# Patient Record
Sex: FEMALE | Race: WHITE | NOT HISPANIC OR LATINO | Employment: OTHER | URBAN - METROPOLITAN AREA
[De-identification: names, ages, dates, MRNs, and addresses within clinical notes are randomized per-mention and may not be internally consistent; named-entity substitution may affect disease eponyms.]

---

## 2017-10-19 ENCOUNTER — HOSPITAL ENCOUNTER (OUTPATIENT)
Dept: MAMMOGRAPHY | Facility: HOSPITAL | Age: 82
Discharge: HOME | End: 2017-10-19

## 2018-10-23 ENCOUNTER — HOSPITAL ENCOUNTER (OUTPATIENT)
Dept: MAMMOGRAPHY | Facility: HOSPITAL | Age: 83
Discharge: HOME | End: 2018-10-23

## 2021-01-18 ENCOUNTER — OFFICE VISIT (OUTPATIENT)
Dept: SURGERY | Facility: CLINIC | Age: 86
End: 2021-01-18

## 2021-01-18 VITALS
HEIGHT: 65 IN | OXYGEN SATURATION: 100 % | RESPIRATION RATE: 18 BRPM | TEMPERATURE: 97.8 F | WEIGHT: 126.2 LBS | BODY MASS INDEX: 21.02 KG/M2 | SYSTOLIC BLOOD PRESSURE: 158 MMHG | DIASTOLIC BLOOD PRESSURE: 79 MMHG | HEART RATE: 63 BPM

## 2021-01-18 DIAGNOSIS — F32.0 MAJOR DEPRESSIVE DISORDER, SINGLE EPISODE, MILD (HCC): ICD-10-CM

## 2021-01-18 DIAGNOSIS — R92.8 ABNORMAL MAMMOGRAM OF LEFT BREAST: Primary | ICD-10-CM

## 2021-01-18 PROCEDURE — 99204 OFFICE O/P NEW MOD 45 MIN: CPT | Performed by: SURGERY

## 2021-01-18 RX ORDER — MIRTAZAPINE 15 MG/1
15 TABLET, FILM COATED ORAL
COMMUNITY

## 2021-01-18 RX ORDER — PENTOXIFYLLINE 400 MG/1
400 TABLET, EXTENDED RELEASE ORAL 2 TIMES DAILY
COMMUNITY
Start: 2017-07-18

## 2021-01-18 RX ORDER — ASPIRIN 81 MG/1
81 TABLET ORAL DAILY
COMMUNITY

## 2021-01-18 RX ORDER — NIACIN 500 MG/1
500 TABLET, EXTENDED RELEASE ORAL
COMMUNITY

## 2021-01-18 RX ORDER — TRAMADOL HYDROCHLORIDE 50 MG/1
100 TABLET ORAL EVERY 6 HOURS PRN
COMMUNITY

## 2021-01-18 NOTE — PROGRESS NOTES
GENERAL SURGERY CONSULTATION NOTE    Consult requested by: Dr. Saavedra    Patient Care Team:  Julissa Saavedra MD as PCP - General    Reason for consult: left breast calcifications    Subjective     Patient is a 86 y.o. female presents with a new area of suspicious calcifications in the upper outer aspect of her left breast.  The patient regularly gets her screening mammograms, and had a screening mammogram which was performed on December 4, 2020.  Her last mammogram on November 12, 2019 was reportedly normal.  She then subsequently had a diagnostic mammogram on the left breast performed on 1/5/2021.  This demonstrated a biopsy marker clip near the 12 o'clock position of the left breast, and a new grouping of calcifications in the lateral left breast located near the 1 to 2 o'clock position.  These calcifications were noted to be heterogenous in appearance, coarse, with some irregular jagged borders.  Stereotactic biopsy was recommended.  Prior to undergoing her mammogram, the patient denied noting any changes of her breast including skin changes, lumps, masses, irregularities of the nipple, or discharge coming from the nipple.  The patient had a lumpectomy performed on her left breast which was benign in 1976.  She reports that she started having her period at the age of 15, had 2 live births the oldest of which at age 21, never breast-fed either child.  She did not take birth control pills.  She went through menopause at the age of 50, and took estrogen supplementation for several years.  Family history is significant for a maternal aunt with breast cancer diagnosed in her 60s.  Maternal uncle with esophageal cancer.  Her mother had none melanotic skin cancers, and her maternal grandmother had a history of anorectal cancer.    Review of Systems   Constitutional: Negative for appetite change, chills and fever.   HENT: Positive for hearing loss. Negative for congestion and sore throat.    Respiratory: Negative for  cough and shortness of breath.    Cardiovascular: Negative for chest pain and palpitations.   Gastrointestinal: Negative for abdominal pain, constipation, diarrhea, nausea, vomiting and GERD.   Genitourinary: Negative for breast discharge, breast lump, breast pain, difficulty urinating, dysuria and frequency.   Musculoskeletal: Negative for arthralgias and back pain.   Skin: Negative for rash and skin lesions.   Neurological: Negative for dizziness, seizures and memory problem.   Hematological: Negative for adenopathy. Does not bruise/bleed easily.   Psychiatric/Behavioral: Negative for sleep disturbance and depressed mood.        History  Past Medical History:   Diagnosis Date   • Breast lump    • Osteoporosis      Past Surgical History:   Procedure Laterality Date   • APPENDECTOMY     • BREAST LUMPECTOMY      Left   • REPLACEMENT TOTAL KNEE      Right   • TONSILLECTOMY       No family history on file.  Social History     Tobacco Use   • Smoking status: Never Smoker   • Smokeless tobacco: Never Used   Substance Use Topics   • Alcohol use: Not on file   • Drug use: Not on file     (Not in a hospital admission)    Allergies:  Morphine and Ampicillin    Objective     Vital Signs  Temp:  [97.8 °F (36.6 °C)] 97.8 °F (36.6 °C)  Heart Rate:  [63] 63  Resp:  [18] 18  BP: (158)/(79) 158/79    Physical Exam  Exam conducted with a chaperone present.   Constitutional:       Appearance: She is well-developed.   HENT:      Head: Normocephalic and atraumatic.   Eyes:      Pupils: Pupils are equal, round, and reactive to light.   Neck:      Musculoskeletal: Normal range of motion.   Cardiovascular:      Rate and Rhythm: Normal rate and regular rhythm.   Pulmonary:      Effort: Pulmonary effort is normal.      Breath sounds: Normal breath sounds.   Chest:      Comments: Both breasts were examined in the upright and supine position.  Both breasts exhibit normal shape and contour.  There are no dominant masses in either breast.   About a centimeter above the nipple areolar complex on the left there is a transverse oriented incision which is well-healed consistent with the patient's prior lumpectomy incision.  There is no nipple retraction, or discharge from the nipple bilaterally.  Abdominal:      General: There is no distension.      Palpations: Abdomen is soft.      Tenderness: There is no abdominal tenderness.      Hernia: No hernia is present.   Musculoskeletal: Normal range of motion.   Lymphadenopathy:      Cervical: No cervical adenopathy.      Upper Body:      Right upper body: No supraclavicular or axillary adenopathy.      Left upper body: No supraclavicular or axillary adenopathy.   Skin:     General: Skin is warm and dry.      Findings: No rash.   Neurological:      Mental Status: She is alert and oriented to person, place, and time.         Results Review:   Lab Results (last 24 hours)     ** No results found for the last 24 hours. **        No radiology results for the last day      I reviewed the patient's new imaging results and agree with the interpretation.  I reviewed the patient's other test results and agree with the interpretation    Assessment/Plan     Active Problems:  Abnormal mammogram on the left    Discussed with patient that I recommend obtaining a core needle biopsy of the area of concern in the left nipple.  Once we have pathologic confirmation as to the etiology of these calcifications, I can better  her regarding whether or not surgical intervention will be needed, and which surgical intervention will be recommended.  She will need to stop her aspirin prior to undergoing corneal biopsy.  I have ordered a stereotactic biopsy of the left breast.  I will see her back in my office about 1 week following her biopsy to review her pathology and reexamine her.    I discussed the patients findings and my recommendations with the patient and her daughter.     Bryan Gallegos MD  01/18/21  12:27  EST

## 2021-01-27 ENCOUNTER — HOSPITAL ENCOUNTER (OUTPATIENT)
Dept: MAMMOGRAPHY | Facility: HOSPITAL | Age: 86
Discharge: HOME | End: 2021-01-27

## 2021-01-27 DIAGNOSIS — R92.8 ABNORMAL MAMMOGRAM OF LEFT BREAST: ICD-10-CM

## 2021-01-27 PROCEDURE — 25010000003 LIDOCAINE 1 % SOLUTION: Performed by: SURGERY

## 2021-01-27 PROCEDURE — 88305 TISSUE EXAM BY PATHOLOGIST: CPT | Performed by: SURGERY

## 2021-01-27 PROCEDURE — 76098 X-RAY EXAM SURGICAL SPECIMEN: CPT

## 2021-01-27 RX ORDER — LIDOCAINE HYDROCHLORIDE AND EPINEPHRINE 10; 10 MG/ML; UG/ML
10 INJECTION, SOLUTION INFILTRATION; PERINEURAL ONCE
Status: COMPLETED | OUTPATIENT
Start: 2021-01-27 | End: 2021-01-27

## 2021-01-27 RX ORDER — LIDOCAINE HYDROCHLORIDE 10 MG/ML
2.5 INJECTION, SOLUTION INFILTRATION; PERINEURAL ONCE
Status: COMPLETED | OUTPATIENT
Start: 2021-01-27 | End: 2021-01-27

## 2021-01-27 RX ADMIN — LIDOCAINE HYDROCHLORIDE 3 ML: 10 INJECTION, SOLUTION INFILTRATION; PERINEURAL at 10:48

## 2021-01-27 RX ADMIN — LIDOCAINE HYDROCHLORIDE,EPINEPHRINE BITARTRATE 9 ML: 10; .01 INJECTION, SOLUTION INFILTRATION; PERINEURAL at 10:48

## 2021-01-29 LAB
LAB AP CASE REPORT: NORMAL
PATH REPORT.FINAL DX SPEC: NORMAL
PATH REPORT.GROSS SPEC: NORMAL

## 2021-02-03 ENCOUNTER — OFFICE VISIT (OUTPATIENT)
Dept: SURGERY | Facility: CLINIC | Age: 86
End: 2021-02-03

## 2021-02-03 VITALS
TEMPERATURE: 97.3 F | HEIGHT: 65 IN | WEIGHT: 125 LBS | BODY MASS INDEX: 20.83 KG/M2 | SYSTOLIC BLOOD PRESSURE: 156 MMHG | DIASTOLIC BLOOD PRESSURE: 68 MMHG | HEART RATE: 73 BPM | OXYGEN SATURATION: 99 %

## 2021-02-03 DIAGNOSIS — R92.8 ABNORMAL MAMMOGRAM OF LEFT BREAST: Primary | ICD-10-CM

## 2021-02-03 PROCEDURE — 99212 OFFICE O/P EST SF 10 MIN: CPT | Performed by: SURGERY

## 2021-02-03 RX ORDER — PRAMIPEXOLE DIHYDROCHLORIDE 0.5 MG/1
0.5 TABLET ORAL 2 TIMES DAILY
COMMUNITY
Start: 2021-01-28

## 2021-02-03 NOTE — PROGRESS NOTES
GENERAL SURGERY CONSULTATION NOTE    Consult requested by: Dr. Saavedra    Patient Care Team:  Julissa Saavedra MD as PCP - General    Reason for consult: left breast calcifications    Subjective   From 1/18/2021:  Patient is a 86 y.o. female presents with a new area of suspicious calcifications in the upper outer aspect of her left breast.  The patient regularly gets her screening mammograms, and had a screening mammogram which was performed on December 4, 2020.  Her last mammogram on November 12, 2019 was reportedly normal.  She then subsequently had a diagnostic mammogram on the left breast performed on 1/5/2021.  This demonstrated a biopsy marker clip near the 12 o'clock position of the left breast, and a new grouping of calcifications in the lateral left breast located near the 1 to 2 o'clock position.  These calcifications were noted to be heterogenous in appearance, coarse, with some irregular jagged borders.  Stereotactic biopsy was recommended.  Prior to undergoing her mammogram, the patient denied noting any changes of her breast including skin changes, lumps, masses, irregularities of the nipple, or discharge coming from the nipple.  The patient had a lumpectomy performed on her left breast which was benign in 1976.  She reports that she started having her period at the age of 15, had 2 live births the oldest of which at age 21, never breast-fed either child.  She did not take birth control pills.  She went through menopause at the age of 50, and took estrogen supplementation for several years.  Family history is significant for a maternal aunt with breast cancer diagnosed in her 60s.  Maternal uncle with esophageal cancer.  Her mother had none melanotic skin cancers, and her maternal grandmother had a history of anorectal cancer.    From today:  Patient had stereotactic left breast biopsy performed on 1/27/2021.  Tolerated well without any difficulty.  No bleeding postoperatively.  No complaints of  pain.    Review of Systems   Constitutional: Negative for appetite change, chills and fever.   HENT: Positive for hearing loss. Negative for congestion and sore throat.    Respiratory: Negative for cough and shortness of breath.    Cardiovascular: Negative for chest pain and palpitations.   Gastrointestinal: Negative for abdominal pain, constipation, diarrhea, nausea, vomiting and GERD.   Genitourinary: Negative for breast discharge, breast lump, breast pain, difficulty urinating, dysuria and frequency.   Musculoskeletal: Negative for arthralgias and back pain.   Skin: Negative for rash and skin lesions.   Neurological: Negative for dizziness, seizures and memory problem.   Hematological: Negative for adenopathy. Does not bruise/bleed easily.   Psychiatric/Behavioral: Negative for sleep disturbance and depressed mood.        History  Past Medical History:   Diagnosis Date   • Breast lump    • Osteoporosis      Past Surgical History:   Procedure Laterality Date   • APPENDECTOMY     • BREAST LUMPECTOMY      Left   • REPLACEMENT TOTAL KNEE      Right   • TONSILLECTOMY       No family history on file.  Social History     Tobacco Use   • Smoking status: Never Smoker   • Smokeless tobacco: Never Used   Substance Use Topics   • Alcohol use: Not on file   • Drug use: Not on file     (Not in a hospital admission)    Allergies:  Morphine and Ampicillin    Objective     Vital Signs  Temp:  [97.3 °F (36.3 °C)] 97.3 °F (36.3 °C)  Heart Rate:  [73] 73  BP: (156)/(68) 156/68    Physical Exam  Exam conducted with a chaperone present.   Constitutional:       Appearance: She is well-developed.   HENT:      Head: Normocephalic and atraumatic.   Eyes:      Pupils: Pupils are equal, round, and reactive to light.   Neck:      Musculoskeletal: Normal range of motion.   Cardiovascular:      Rate and Rhythm: Normal rate and regular rhythm.   Pulmonary:      Effort: Pulmonary effort is normal.      Breath sounds: Normal breath sounds.    Chest:      Comments: Both breasts were examined in the upright and supine position.  Both breasts exhibit normal shape and contour.  There are no dominant masses in either breast.  About a centimeter above the nipple areolar complex on the left there is a transverse oriented incision which is well-healed consistent with the patient's prior lumpectomy incision.  On the superior lateral aspect of the left breast there is a pinpoint area consistent with her biopsy site.  There is no hematoma.  There is no nipple retraction, or discharge from the nipple bilaterally.  Abdominal:      General: There is no distension.      Palpations: Abdomen is soft.      Tenderness: There is no abdominal tenderness.      Hernia: No hernia is present.   Musculoskeletal: Normal range of motion.   Lymphadenopathy:      Cervical: No cervical adenopathy.      Upper Body:      Right upper body: No supraclavicular or axillary adenopathy.      Left upper body: No supraclavicular or axillary adenopathy.   Skin:     General: Skin is warm and dry.      Findings: No rash.   Neurological:      Mental Status: She is alert and oriented to person, place, and time.         Results Review:   Lab Results (last 24 hours)     ** No results found for the last 24 hours. **        No radiology results for the last day      I reviewed the patient's new imaging results and agree with the interpretation.  I reviewed the patient's other test results and agree with the interpretation    Assessment/Plan     Active Problems:  Abnormal mammogram on the left    No immediate complications from stereotactic biopsy  Pathology reviewed with the patient and daughters:  Calcifications, left breast, biopsies:    Benign breast tissue with stromal fibrosis    Microcalcifications identified within benign stroma    No malignancy identified   This is considered to be a concordant finding.  However upon review of the report from the biopsy, it was technically challenging, and the  radiologist would prefer to repeat the imaging in 6 months to ensure that this is in fact a benign process.  Discussed this with the patient, who understands.  We will obtain a repeat diagnostic mammogram and ultrasound in 6 months and I will see her back in my office after that.    I discussed the patients findings and my recommendations with the patient and her daughter.     Bryan Gallegos MD  02/03/21  10:49 EST

## 2022-06-30 ENCOUNTER — OFFICE (OUTPATIENT)
Dept: URBAN - METROPOLITAN AREA CLINIC 64 | Facility: CLINIC | Age: 87
End: 2022-06-30

## 2022-06-30 VITALS
HEIGHT: 60 IN | HEART RATE: 68 BPM | WEIGHT: 121 LBS | SYSTOLIC BLOOD PRESSURE: 164 MMHG | DIASTOLIC BLOOD PRESSURE: 85 MMHG

## 2022-06-30 DIAGNOSIS — D50.9 IRON DEFICIENCY ANEMIA, UNSPECIFIED: ICD-10-CM

## 2022-06-30 PROCEDURE — 99204 OFFICE O/P NEW MOD 45 MIN: CPT | Performed by: INTERNAL MEDICINE

## 2022-06-30 NOTE — SERVICEHPINOTES
Restart all medications  Buy right handed carpal tunnel splint and try wearing it to see if notice any difference   
DOMENIC OSULLIVAN  is a    87   year old  female   c hx of OA and back surgery who is being seen in the office today for   anemia. She was having fatigue and had labs showing hgb 8.5. She was given IV iron and hgb improved. She notes dark stool on oral MVI c iron. No abd pain, brbpr, n/v, dysphagia or bowel issues. Her wt has been stable. No nsaids. 
br
br br
About 10 years ago reportedly normal colonoscopybr
br  March 2022 hgb 8.5 mcv 76

## 2022-08-22 RX ORDER — DIPHENOXYLATE HYDROCHLORIDE AND ATROPINE SULFATE 2.5; .025 MG/1; MG/1
1 TABLET ORAL DAILY
COMMUNITY

## 2022-08-27 ENCOUNTER — LAB (OUTPATIENT)
Dept: LAB | Facility: HOSPITAL | Age: 87
End: 2022-08-27

## 2022-08-27 PROCEDURE — C9803 HOPD COVID-19 SPEC COLLECT: HCPCS

## 2022-08-27 PROCEDURE — U0005 INFEC AGEN DETEC AMPLI PROBE: HCPCS

## 2022-08-27 PROCEDURE — U0004 COV-19 TEST NON-CDC HGH THRU: HCPCS

## 2022-08-28 LAB — SARS-COV-2 ORF1AB RESP QL NAA+PROBE: NOT DETECTED

## 2022-08-29 ENCOUNTER — ANESTHESIA EVENT (OUTPATIENT)
Dept: GASTROENTEROLOGY | Facility: HOSPITAL | Age: 87
End: 2022-08-29

## 2022-08-29 RX ORDER — SODIUM CHLORIDE 9 MG/ML
9 INJECTION, SOLUTION INTRAVENOUS CONTINUOUS PRN
Status: CANCELLED | OUTPATIENT
Start: 2022-08-29

## 2022-08-29 RX ORDER — SODIUM CHLORIDE 0.9 % (FLUSH) 0.9 %
10 SYRINGE (ML) INJECTION EVERY 12 HOURS SCHEDULED
Status: CANCELLED | OUTPATIENT
Start: 2022-08-29

## 2022-08-29 RX ORDER — MIDAZOLAM HYDROCHLORIDE 1 MG/ML
0.5 INJECTION INTRAMUSCULAR; INTRAVENOUS
Status: CANCELLED | OUTPATIENT
Start: 2022-08-29

## 2022-08-29 RX ORDER — SODIUM CHLORIDE 0.9 % (FLUSH) 0.9 %
10 SYRINGE (ML) INJECTION AS NEEDED
Status: CANCELLED | OUTPATIENT
Start: 2022-08-29

## 2022-08-30 ENCOUNTER — ON CAMPUS - OUTPATIENT (OUTPATIENT)
Dept: URBAN - METROPOLITAN AREA HOSPITAL 85 | Facility: HOSPITAL | Age: 87
End: 2022-08-30

## 2022-08-30 ENCOUNTER — HOSPITAL ENCOUNTER (OUTPATIENT)
Facility: HOSPITAL | Age: 87
Setting detail: HOSPITAL OUTPATIENT SURGERY
Discharge: HOME | End: 2022-08-30
Attending: INTERNAL MEDICINE | Admitting: INTERNAL MEDICINE

## 2022-08-30 ENCOUNTER — ANESTHESIA (OUTPATIENT)
Dept: GASTROENTEROLOGY | Facility: HOSPITAL | Age: 87
End: 2022-08-30

## 2022-08-30 VITALS
TEMPERATURE: 98.2 F | DIASTOLIC BLOOD PRESSURE: 91 MMHG | RESPIRATION RATE: 26 BRPM | HEART RATE: 79 BPM | SYSTOLIC BLOOD PRESSURE: 147 MMHG | WEIGHT: 120 LBS | OXYGEN SATURATION: 99 % | BODY MASS INDEX: 23.56 KG/M2 | HEIGHT: 60 IN

## 2022-08-30 DIAGNOSIS — K31.89 OTHER DISEASES OF STOMACH AND DUODENUM: ICD-10-CM

## 2022-08-30 DIAGNOSIS — K25.7 CHRONIC GASTRIC ULCER WITHOUT HEMORRHAGE OR PERFORATION: ICD-10-CM

## 2022-08-30 DIAGNOSIS — D50.0 IRON DEFICIENCY ANEMIA SECONDARY TO BLOOD LOSS (CHRONIC): ICD-10-CM

## 2022-08-30 PROCEDURE — 43239 EGD BIOPSY SINGLE/MULTIPLE: CPT | Performed by: INTERNAL MEDICINE

## 2022-08-30 PROCEDURE — 25010000002 PROPOFOL 200 MG/20ML EMULSION: Performed by: ANESTHESIOLOGY

## 2022-08-30 PROCEDURE — 88305 TISSUE EXAM BY PATHOLOGIST: CPT | Performed by: INTERNAL MEDICINE

## 2022-08-30 RX ORDER — SODIUM CHLORIDE 0.9 % (FLUSH) 0.9 %
3 SYRINGE (ML) INJECTION EVERY 12 HOURS SCHEDULED
Status: CANCELLED | OUTPATIENT
Start: 2022-08-30

## 2022-08-30 RX ORDER — SODIUM CHLORIDE 0.9 % (FLUSH) 0.9 %
3-10 SYRINGE (ML) INJECTION AS NEEDED
Status: CANCELLED | OUTPATIENT
Start: 2022-08-30

## 2022-08-30 RX ORDER — PANTOPRAZOLE SODIUM 40 MG/1
40 TABLET, DELAYED RELEASE ORAL DAILY
Qty: 90 TABLET | Refills: 3 | Status: SHIPPED | OUTPATIENT
Start: 2022-08-30

## 2022-08-30 RX ORDER — ONDANSETRON 2 MG/ML
4 INJECTION INTRAMUSCULAR; INTRAVENOUS ONCE AS NEEDED
Status: DISCONTINUED | OUTPATIENT
Start: 2022-08-30 | End: 2022-08-30 | Stop reason: HOSPADM

## 2022-08-30 RX ORDER — PROPOFOL 10 MG/ML
INJECTION, EMULSION INTRAVENOUS AS NEEDED
Status: DISCONTINUED | OUTPATIENT
Start: 2022-08-30 | End: 2022-08-30 | Stop reason: SURG

## 2022-08-30 RX ORDER — SODIUM CHLORIDE 9 MG/ML
INJECTION, SOLUTION INTRAVENOUS CONTINUOUS PRN
Status: DISCONTINUED | OUTPATIENT
Start: 2022-08-30 | End: 2022-08-30 | Stop reason: SURG

## 2022-08-30 RX ADMIN — SODIUM CHLORIDE: 0.9 INJECTION, SOLUTION INTRAVENOUS at 11:31

## 2022-08-30 RX ADMIN — PROPOFOL 80 MG: 10 INJECTION, EMULSION INTRAVENOUS at 11:34

## 2022-08-30 RX ADMIN — PROPOFOL 30 MG: 10 INJECTION, EMULSION INTRAVENOUS at 11:36

## 2022-08-30 NOTE — ANESTHESIA PREPROCEDURE EVALUATION
Anesthesia Evaluation     Patient summary reviewed and Nursing notes reviewed   NPO Solid Status: > 8 hours  NPO Liquid Status: > 8 hours           Airway   Mallampati: II  TM distance: >3 FB  Neck ROM: full  No difficulty expected  Dental - normal exam     Pulmonary - negative pulmonary ROS and normal exam   Cardiovascular - normal exam    (+) hypertension,       Neuro/Psych  (+) tremors, psychiatric history Depression,    GI/Hepatic/Renal/Endo - negative ROS     Musculoskeletal (-) negative ROS    Abdominal  - normal exam    Bowel sounds: normal.   Substance History - negative use     OB/GYN negative ob/gyn ROS         Other                        Anesthesia Plan    ASA 3     MAC   total IV anesthesia  intravenous induction     Anesthetic plan, risks, benefits, and alternatives have been provided, discussed and informed consent has been obtained with: patient.  Pre-procedure education provided      CODE STATUS:

## 2022-08-30 NOTE — ANESTHESIA POSTPROCEDURE EVALUATION
Patient: Juany Dalton    Procedure Summary     Date: 08/30/22 Room / Location: [facility] ENDOSCOPY 1 / [facility] ENDOSCOPY    Anesthesia Start: 1131 Anesthesia Stop: 1139    Procedure: ESOPHAGOGASTRODUODENOSCOPY (N/A ) Diagnosis:       Iron deficiency anemia secondary to blood loss (chronic)      (Iron deficiency anemia secondary to blood loss (chronic) [D50.0])    Surgeons: Jan Ortega MD Provider: Aureliano Ingram MD    Anesthesia Type: MAC ASA Status: 3          Anesthesia Type: MAC    Vitals  No vitals data found for the desired time range.          Post Anesthesia Care and Evaluation    Patient location during evaluation: PACU  Patient participation: complete - patient participated  Level of consciousness: awake  Pain scale: See nurse's notes for pain score.  Pain management: adequate    Airway patency: patent  Anesthetic complications: No anesthetic complications  PONV Status: none  Cardiovascular status: acceptable  Respiratory status: acceptable  Hydration status: acceptable    Comments: Patient seen and examined postoperatively; vital signs stable; SpO2 greater than or equal to 90%; cardiopulmonary status stable; nausea/vomiting adequately controlled; pain adequately controlled; no apparent anesthesia complications; patient discharged from anesthesia care when discharge criteria were met

## 2022-08-31 LAB
LAB AP CASE REPORT: NORMAL
LAB AP CLINICAL INFORMATION: NORMAL
PATH REPORT.FINAL DX SPEC: NORMAL
PATH REPORT.GROSS SPEC: NORMAL

## 2024-01-05 ENCOUNTER — APPOINTMENT (OUTPATIENT)
Dept: GENERAL RADIOLOGY | Facility: HOSPITAL | Age: 89
End: 2024-01-05
Payer: MEDICARE

## 2024-01-05 ENCOUNTER — APPOINTMENT (OUTPATIENT)
Dept: CT IMAGING | Facility: HOSPITAL | Age: 89
End: 2024-01-05
Payer: MEDICARE

## 2024-01-05 ENCOUNTER — HOSPITAL ENCOUNTER (EMERGENCY)
Facility: HOSPITAL | Age: 89
Discharge: HOME | End: 2024-01-05
Payer: MEDICARE

## 2024-01-05 VITALS
SYSTOLIC BLOOD PRESSURE: 177 MMHG | WEIGHT: 120 LBS | RESPIRATION RATE: 16 BRPM | OXYGEN SATURATION: 96 % | TEMPERATURE: 98 F | HEIGHT: 60 IN | DIASTOLIC BLOOD PRESSURE: 79 MMHG | BODY MASS INDEX: 23.56 KG/M2 | HEART RATE: 82 BPM

## 2024-01-05 DIAGNOSIS — R29.898 LEG WEAKNESS, BILATERAL: Primary | ICD-10-CM

## 2024-01-05 LAB
ANION GAP SERPL CALCULATED.3IONS-SCNC: 11 MMOL/L (ref 5–15)
BACTERIA UR QL AUTO: ABNORMAL /HPF
BASOPHILS # BLD AUTO: 0.1 10*3/MM3 (ref 0–0.2)
BASOPHILS NFR BLD AUTO: 1.1 % (ref 0–1.5)
BILIRUB UR QL STRIP: NEGATIVE
BUN SERPL-MCNC: 24 MG/DL (ref 8–23)
BUN/CREAT SERPL: 22.9 (ref 7–25)
CALCIUM SPEC-SCNC: 9.3 MG/DL (ref 8.6–10.5)
CHLORIDE SERPL-SCNC: 104 MMOL/L (ref 98–107)
CLARITY UR: CLEAR
CO2 SERPL-SCNC: 24 MMOL/L (ref 22–29)
COLOR UR: YELLOW
CREAT SERPL-MCNC: 1.05 MG/DL (ref 0.57–1)
DEPRECATED RDW RBC AUTO: 45.1 FL (ref 37–54)
EGFRCR SERPLBLD CKD-EPI 2021: 50.9 ML/MIN/1.73
EOSINOPHIL # BLD AUTO: 0.1 10*3/MM3 (ref 0–0.4)
EOSINOPHIL NFR BLD AUTO: 0.9 % (ref 0.3–6.2)
ERYTHROCYTE [DISTWIDTH] IN BLOOD BY AUTOMATED COUNT: 14.3 % (ref 12.3–15.4)
FLUAV SUBTYP SPEC NAA+PROBE: NOT DETECTED
FLUBV RNA ISLT QL NAA+PROBE: NOT DETECTED
GEN 5 2HR TROPONIN T REFLEX: 33 NG/L
GLUCOSE SERPL-MCNC: 81 MG/DL (ref 65–99)
GLUCOSE UR STRIP-MCNC: NEGATIVE MG/DL
HCT VFR BLD AUTO: 35.9 % (ref 34–46.6)
HGB BLD-MCNC: 11.6 G/DL (ref 12–15.9)
HGB UR QL STRIP.AUTO: ABNORMAL
HOLD SPECIMEN: NORMAL
HOLD SPECIMEN: NORMAL
HYALINE CASTS UR QL AUTO: ABNORMAL /LPF
KETONES UR QL STRIP: NEGATIVE
LEUKOCYTE ESTERASE UR QL STRIP.AUTO: NEGATIVE
LYMPHOCYTES # BLD AUTO: 1.5 10*3/MM3 (ref 0.7–3.1)
LYMPHOCYTES NFR BLD AUTO: 27.1 % (ref 19.6–45.3)
MCH RBC QN AUTO: 27.5 PG (ref 26.6–33)
MCHC RBC AUTO-ENTMCNC: 32.4 G/DL (ref 31.5–35.7)
MCV RBC AUTO: 85 FL (ref 79–97)
MONOCYTES # BLD AUTO: 0.4 10*3/MM3 (ref 0.1–0.9)
MONOCYTES NFR BLD AUTO: 7.5 % (ref 5–12)
NEUTROPHILS NFR BLD AUTO: 3.4 10*3/MM3 (ref 1.7–7)
NEUTROPHILS NFR BLD AUTO: 63.4 % (ref 42.7–76)
NITRITE UR QL STRIP: NEGATIVE
NRBC BLD AUTO-RTO: 0 /100 WBC (ref 0–0.2)
NT-PROBNP SERPL-MCNC: 555.7 PG/ML (ref 0–1800)
PH UR STRIP.AUTO: 6.5 [PH] (ref 5–8)
PLATELET # BLD AUTO: 201 10*3/MM3 (ref 140–450)
PMV BLD AUTO: 7.1 FL (ref 6–12)
POTASSIUM SERPL-SCNC: 4.4 MMOL/L (ref 3.5–5.2)
PROT UR QL STRIP: NEGATIVE
RBC # BLD AUTO: 4.22 10*6/MM3 (ref 3.77–5.28)
RBC # UR STRIP: ABNORMAL /HPF
REF LAB TEST METHOD: ABNORMAL
SARS-COV-2 RNA RESP QL NAA+PROBE: NOT DETECTED
SODIUM SERPL-SCNC: 139 MMOL/L (ref 136–145)
SP GR UR STRIP: 1.01 (ref 1–1.03)
SQUAMOUS #/AREA URNS HPF: ABNORMAL /HPF
TROPONIN T DELTA: 1 NG/L
TROPONIN T SERPL HS-MCNC: 32 NG/L
UROBILINOGEN UR QL STRIP: ABNORMAL
WBC # UR STRIP: ABNORMAL /HPF
WBC NRBC COR # BLD AUTO: 5.4 10*3/MM3 (ref 3.4–10.8)
WHOLE BLOOD HOLD COAG: NORMAL
WHOLE BLOOD HOLD SPECIMEN: NORMAL

## 2024-01-05 PROCEDURE — 36415 COLL VENOUS BLD VENIPUNCTURE: CPT

## 2024-01-05 PROCEDURE — 99284 EMERGENCY DEPT VISIT MOD MDM: CPT

## 2024-01-05 PROCEDURE — 84484 ASSAY OF TROPONIN QUANT: CPT | Performed by: PHYSICIAN ASSISTANT

## 2024-01-05 PROCEDURE — P9612 CATHETERIZE FOR URINE SPEC: HCPCS

## 2024-01-05 PROCEDURE — 85025 COMPLETE CBC W/AUTO DIFF WBC: CPT | Performed by: PHYSICIAN ASSISTANT

## 2024-01-05 PROCEDURE — 80048 BASIC METABOLIC PNL TOTAL CA: CPT | Performed by: PHYSICIAN ASSISTANT

## 2024-01-05 PROCEDURE — 87636 SARSCOV2 & INF A&B AMP PRB: CPT | Performed by: PHYSICIAN ASSISTANT

## 2024-01-05 PROCEDURE — 71045 X-RAY EXAM CHEST 1 VIEW: CPT

## 2024-01-05 PROCEDURE — 70450 CT HEAD/BRAIN W/O DYE: CPT

## 2024-01-05 PROCEDURE — 93005 ELECTROCARDIOGRAM TRACING: CPT | Performed by: PHYSICIAN ASSISTANT

## 2024-01-05 PROCEDURE — 81001 URINALYSIS AUTO W/SCOPE: CPT | Performed by: PHYSICIAN ASSISTANT

## 2024-01-05 PROCEDURE — 83880 ASSAY OF NATRIURETIC PEPTIDE: CPT | Performed by: PHYSICIAN ASSISTANT

## 2024-01-05 RX ORDER — SODIUM CHLORIDE 0.9 % (FLUSH) 0.9 %
10 SYRINGE (ML) INJECTION AS NEEDED
Status: DISCONTINUED | OUTPATIENT
Start: 2024-01-05 | End: 2024-01-05 | Stop reason: HOSPADM

## 2024-01-05 NOTE — ED NOTES
Pt reports difficulty walking this am. Felt like she was shuffling. Has had this for 2-3 years but it is worse today.

## 2024-01-06 LAB
QT INTERVAL: 384 MS
QTC INTERVAL: 439 MS

## 2024-01-06 NOTE — ED PROVIDER NOTES
Subjective   History of Present Illness  Patient is an 89-year-old female who is brought in by her daughter for feeling like she is having leg weakness today.  She reports that it feels like there is a disconnect between her brain and her legs working.  She has been resting all day.  Her daughter does report that maybe she did not eat and drink as much is normal.  Denies any chest pain abdominal pain cough congestion fevers chills.        Review of Systems   Constitutional:  Negative for chills, diaphoresis, fatigue and fever.   HENT:  Negative for congestion, ear pain, sinus pressure, sore throat, trouble swallowing and voice change.    Respiratory:  Negative for cough.    Cardiovascular:  Negative for chest pain and palpitations.   Gastrointestinal:  Negative for abdominal distention, nausea and vomiting.   Genitourinary: Negative.    Musculoskeletal: Negative.    Skin: Negative.    Neurological: Negative.  Positive for weakness.   Psychiatric/Behavioral: Negative.         Past Medical History:   Diagnosis Date    Arthritis     Back pain     Breast lump     Leg cramps     Osteoporosis     Tremor        Allergies   Allergen Reactions    Morphine Hives and Rash    Ampicillin Hives       Past Surgical History:   Procedure Laterality Date    APPENDECTOMY      BREAST LUMPECTOMY      Left    ENDOSCOPY N/A 8/30/2022    Procedure: ESOPHAGOGASTRODUODENOSCOPY;  Surgeon: Jan Ortega MD;  Location: [facility] ENDOSCOPY;  Service: Gastroenterology;  Laterality: N/A;  normal    REPLACEMENT TOTAL KNEE      Right    TONSILLECTOMY         No family history on file.    Social History     Socioeconomic History    Marital status:    Tobacco Use    Smoking status: Never    Smokeless tobacco: Never   Substance and Sexual Activity    Alcohol use: Not Currently    Drug use: Not Currently    Sexual activity: Defer           Objective   Physical Exam  Vitals and nursing note reviewed.   Constitutional:       General: She is not in  "acute distress.     Appearance: Normal appearance. She is well-developed. She is not ill-appearing, toxic-appearing or diaphoretic.   HENT:      Head: Normocephalic and atraumatic.      Nose: Nose normal.   Eyes:      Conjunctiva/sclera: Conjunctivae normal.   Cardiovascular:      Rate and Rhythm: Normal rate and regular rhythm.   Pulmonary:      Effort: Pulmonary effort is normal. No respiratory distress.      Breath sounds: Normal breath sounds. No stridor. No wheezing, rhonchi or rales.   Musculoskeletal:         General: Normal range of motion.      Cervical back: Normal range of motion and neck supple.   Skin:     General: Skin is warm and dry.   Neurological:      General: No focal deficit present.      Mental Status: She is alert and oriented to person, place, and time. Mental status is at baseline.      Cranial Nerves: No cranial nerve deficit.      Sensory: No sensory deficit.      Motor: No weakness.      Coordination: Coordination normal.      Comments: Negative pronator drift  Strength intact in upper and lower extremity against resistance.  Sensation intact in upper and lower extremities.     Psychiatric:         Mood and Affect: Mood normal.         Behavior: Behavior normal.         Thought Content: Thought content normal.         Judgment: Judgment normal.         Procedures           ED Course  ED Course as of 01/05/24 2042 Fri Jan 05, 2024 2008 EKG interpreted by ER physician reviewed by myself.  Sinus rhythm rate of 79 prolonged UT interval [MG]      ED Course User Index  [MG] Elise Rhodes PA-C                                             Medical Decision Making  Appropriate PPE worn during exam.    /72   Pulse 74   Temp 98.3 °F (36.8 °C) (Oral)   Resp 18   Ht 152.4 cm (60\")   Wt 54.4 kg (120 lb)   SpO2 97%   BMI 23.44 kg/m²      Co-morbidities --  has a past medical history of Arthritis, Back pain, Breast lump, Leg cramps, Osteoporosis, and Tremor.  Radiology interpretation --  " X-rays reviewed by me and interpreted by radiologist:  CT Head Without Contrast    Result Date: 1/5/2024  Impression: No acute intracranial abnormality. If there is persistent clinical concern for acute stroke, please consider follow-up with dedicated MRI of the brain Electronically Signed: Raheem Ramirez DO  1/5/2024 7:31 PM EST  Workstation ID: [id number]    XR Chest 1 View    Result Date: 1/5/2024  Impression: 1. No acute cardiopulmonary disease. Electronically Signed: Jm Streeter MD  1/5/2024 6:15 PM EST  Workstation ID: [id number]        Differentials -- Includes, but not limited to the following:     Patient is an 89-year-old female presents with generalized leg weakness.  No objective weakness on exam.  I offered to keep the patient for evaluation for physical therapy for weakness which she declined her imaging and lab work was fairly unremarkable although she did have some elevation in her troponin repeat troponin had no change.  Stable at time of discharge return precautions were provided she was in agreement with plan.  Daughter is at bedside.  I discussed the findings and recommendations with the patient who voices understanding. Stable while in the ER.     Note Disclaimer: At [facility], we believe that sharing information builds trust and better relationships. You are receiving this note because you are receiving care at [facility] or recently visited. It is possible you will see health information before a provider has talked with you about it. This kind of information can be easy to misunderstand. To help you fully understand what it means for your health, we urge you to discuss this note with your provider.        Problems Addressed:  Leg weakness, bilateral: complicated acute illness or injury    Amount and/or Complexity of Data Reviewed  Labs: ordered.  Radiology: ordered.  ECG/medicine tests: ordered.    Risk  Prescription drug management.        Final diagnoses:   Leg weakness,  bilateral       ED Disposition  ED Disposition       ED Disposition   Discharge    Condition   Stable    Comment   --               Dao Ortega MD  [address] [facility] [address] [facility] [address]  [place] [id number]  [phone]    Schedule an appointment as soon as possible for a visit       Natan Kearney MD  [address] [facility] [address]  [place] [id number] [phone]               Medication List      No changes were made to your prescriptions during this visit.            Elise Rhodes PA-C  01/05/24 2041

## 2024-11-07 ENCOUNTER — APPOINTMENT (OUTPATIENT)
Dept: GENERAL RADIOLOGY | Facility: HOSPITAL | Age: 89
DRG: 607 | End: 2024-11-07
Payer: MEDICARE

## 2024-11-07 ENCOUNTER — HOSPITAL ENCOUNTER (INPATIENT)
Facility: HOSPITAL | Age: 89
LOS: 4 days | Discharge: HOME | DRG: 607 | End: 2024-11-12
Attending: EMERGENCY MEDICINE
Payer: MEDICARE

## 2024-11-07 ENCOUNTER — APPOINTMENT (OUTPATIENT)
Dept: CT IMAGING | Facility: HOSPITAL | Age: 89
DRG: 607 | End: 2024-11-07
Payer: MEDICARE

## 2024-11-07 DIAGNOSIS — R06.02 SHORTNESS OF BREATH: ICD-10-CM

## 2024-11-07 DIAGNOSIS — G89.29 CHRONIC MIDLINE LOW BACK PAIN WITHOUT SCIATICA: ICD-10-CM

## 2024-11-07 DIAGNOSIS — M54.50 CHRONIC MIDLINE LOW BACK PAIN WITHOUT SCIATICA: ICD-10-CM

## 2024-11-07 DIAGNOSIS — R79.89 ELEVATED TROPONIN: Primary | ICD-10-CM

## 2024-11-07 DIAGNOSIS — R22.2 CHEST WALL MASS: ICD-10-CM

## 2024-11-07 DIAGNOSIS — R60.0 BILATERAL LEG EDEMA: ICD-10-CM

## 2024-11-07 PROBLEM — C79.51 LUNG CANCER METASTATIC TO BONE: Status: ACTIVE | Noted: 2024-11-07

## 2024-11-07 PROBLEM — C79.51 LUNG CANCER METASTATIC TO BONE: Status: RESOLVED | Noted: 2024-11-07 | Resolved: 2024-11-07

## 2024-11-07 PROBLEM — M79.89 LEG SWELLING: Status: ACTIVE | Noted: 2024-11-07

## 2024-11-07 PROBLEM — C34.90 LUNG CANCER METASTATIC TO BONE: Status: RESOLVED | Noted: 2024-11-07 | Resolved: 2024-11-07

## 2024-11-07 PROBLEM — C34.90 LUNG CANCER METASTATIC TO BONE: Status: ACTIVE | Noted: 2024-11-07

## 2024-11-07 LAB
ALBUMIN SERPL-MCNC: 4.5 G/DL (ref 3.5–5.2)
ALBUMIN/GLOB SERPL: 1.9 G/DL
ALP SERPL-CCNC: 50 U/L (ref 39–117)
ALT SERPL W P-5'-P-CCNC: 18 U/L (ref 1–33)
ANION GAP SERPL CALCULATED.3IONS-SCNC: 14.2 MMOL/L (ref 5–15)
AST SERPL-CCNC: 28 U/L (ref 1–32)
BACTERIA UR QL AUTO: NORMAL /HPF
BASOPHILS # BLD AUTO: 0.02 10*3/MM3 (ref 0–0.2)
BASOPHILS NFR BLD AUTO: 0.3 % (ref 0–1.5)
BILIRUB SERPL-MCNC: 0.3 MG/DL (ref 0–1.2)
BILIRUB UR QL STRIP: NEGATIVE
BUN SERPL-MCNC: 19 MG/DL (ref 8–23)
BUN/CREAT SERPL: 14.8 (ref 7–25)
CALCIUM SPEC-SCNC: 9.7 MG/DL (ref 8.6–10.5)
CHLORIDE SERPL-SCNC: 97 MMOL/L (ref 98–107)
CLARITY UR: CLEAR
CO2 SERPL-SCNC: 21.8 MMOL/L (ref 22–29)
COLOR UR: YELLOW
CREAT SERPL-MCNC: 1.28 MG/DL (ref 0.57–1)
DEPRECATED RDW RBC AUTO: 42.9 FL (ref 37–54)
EGFRCR SERPLBLD CKD-EPI 2021: 40.1 ML/MIN/1.73
EOSINOPHIL # BLD AUTO: 0.11 10*3/MM3 (ref 0–0.4)
EOSINOPHIL NFR BLD AUTO: 1.9 % (ref 0.3–6.2)
ERYTHROCYTE [DISTWIDTH] IN BLOOD BY AUTOMATED COUNT: 13.5 % (ref 12.3–15.4)
GEN 5 2HR TROPONIN T REFLEX: 54 NG/L
GLOBULIN UR ELPH-MCNC: 2.4 GM/DL
GLUCOSE SERPL-MCNC: 96 MG/DL (ref 65–99)
GLUCOSE UR STRIP-MCNC: NEGATIVE MG/DL
HCT VFR BLD AUTO: 34.5 % (ref 34–46.6)
HGB BLD-MCNC: 11 G/DL (ref 12–15.9)
HGB UR QL STRIP.AUTO: ABNORMAL
HOLD SPECIMEN: NORMAL
HOLD SPECIMEN: NORMAL
HYALINE CASTS UR QL AUTO: NORMAL /LPF
IMM GRANULOCYTES # BLD AUTO: 0.02 10*3/MM3 (ref 0–0.05)
IMM GRANULOCYTES NFR BLD AUTO: 0.3 % (ref 0–0.5)
KETONES UR QL STRIP: NEGATIVE
LEUKOCYTE ESTERASE UR QL STRIP.AUTO: ABNORMAL
LYMPHOCYTES # BLD AUTO: 0.74 10*3/MM3 (ref 0.7–3.1)
LYMPHOCYTES NFR BLD AUTO: 12.6 % (ref 19.6–45.3)
MAGNESIUM SERPL-MCNC: 1.9 MG/DL (ref 1.6–2.4)
MCH RBC QN AUTO: 27.7 PG (ref 26.6–33)
MCHC RBC AUTO-ENTMCNC: 31.9 G/DL (ref 31.5–35.7)
MCV RBC AUTO: 86.9 FL (ref 79–97)
MONOCYTES # BLD AUTO: 0.41 10*3/MM3 (ref 0.1–0.9)
MONOCYTES NFR BLD AUTO: 7 % (ref 5–12)
NEUTROPHILS NFR BLD AUTO: 4.58 10*3/MM3 (ref 1.7–7)
NEUTROPHILS NFR BLD AUTO: 77.9 % (ref 42.7–76)
NITRITE UR QL STRIP: NEGATIVE
NRBC BLD AUTO-RTO: 0 /100 WBC (ref 0–0.2)
NT-PROBNP SERPL-MCNC: 946 PG/ML (ref 0–1800)
PH UR STRIP.AUTO: 6 [PH] (ref 5–8)
PLATELET # BLD AUTO: 229 10*3/MM3 (ref 140–450)
PMV BLD AUTO: 9.1 FL (ref 6–12)
POTASSIUM SERPL-SCNC: 4.7 MMOL/L (ref 3.5–5.2)
PROT SERPL-MCNC: 6.9 G/DL (ref 6–8.5)
PROT UR QL STRIP: NEGATIVE
RBC # BLD AUTO: 3.97 10*6/MM3 (ref 3.77–5.28)
RBC # UR STRIP: NORMAL /HPF
REF LAB TEST METHOD: NORMAL
SODIUM SERPL-SCNC: 133 MMOL/L (ref 136–145)
SP GR UR STRIP: 1.01 (ref 1–1.03)
SQUAMOUS #/AREA URNS HPF: NORMAL /HPF
TROPONIN T DELTA: 0 NG/L
TROPONIN T SERPL HS-MCNC: 54 NG/L
UROBILINOGEN UR QL STRIP: ABNORMAL
WBC # UR STRIP: NORMAL /HPF
WBC NRBC COR # BLD AUTO: 5.88 10*3/MM3 (ref 3.4–10.8)
WHOLE BLOOD HOLD COAG: NORMAL
WHOLE BLOOD HOLD SPECIMEN: NORMAL

## 2024-11-07 PROCEDURE — 36415 COLL VENOUS BLD VENIPUNCTURE: CPT

## 2024-11-07 PROCEDURE — 25010000002 FUROSEMIDE PER 20 MG: Performed by: EMERGENCY MEDICINE

## 2024-11-07 PROCEDURE — 71260 CT THORAX DX C+: CPT

## 2024-11-07 PROCEDURE — 25510000001 IOPAMIDOL PER 1 ML: Performed by: EMERGENCY MEDICINE

## 2024-11-07 PROCEDURE — 84484 ASSAY OF TROPONIN QUANT: CPT | Performed by: EMERGENCY MEDICINE

## 2024-11-07 PROCEDURE — G0378 HOSPITAL OBSERVATION PER HR: HCPCS

## 2024-11-07 PROCEDURE — 85025 COMPLETE CBC W/AUTO DIFF WBC: CPT | Performed by: EMERGENCY MEDICINE

## 2024-11-07 PROCEDURE — 93005 ELECTROCARDIOGRAM TRACING: CPT | Performed by: EMERGENCY MEDICINE

## 2024-11-07 PROCEDURE — 83735 ASSAY OF MAGNESIUM: CPT

## 2024-11-07 PROCEDURE — P9612 CATHETERIZE FOR URINE SPEC: HCPCS

## 2024-11-07 PROCEDURE — 99285 EMERGENCY DEPT VISIT HI MDM: CPT

## 2024-11-07 PROCEDURE — 25010000002 HYDROMORPHONE 1 MG/ML SOLUTION: Performed by: EMERGENCY MEDICINE

## 2024-11-07 PROCEDURE — 71045 X-RAY EXAM CHEST 1 VIEW: CPT

## 2024-11-07 PROCEDURE — 80053 COMPREHEN METABOLIC PANEL: CPT | Performed by: EMERGENCY MEDICINE

## 2024-11-07 PROCEDURE — 83880 ASSAY OF NATRIURETIC PEPTIDE: CPT | Performed by: EMERGENCY MEDICINE

## 2024-11-07 PROCEDURE — 81001 URINALYSIS AUTO W/SCOPE: CPT | Performed by: EMERGENCY MEDICINE

## 2024-11-07 RX ORDER — LIDOCAINE 4 G/G
1 PATCH TOPICAL NIGHTLY
Status: DISCONTINUED | OUTPATIENT
Start: 2024-11-07 | End: 2024-11-12 | Stop reason: HOSPADM

## 2024-11-07 RX ORDER — NITROGLYCERIN 0.4 MG/1
0.4 TABLET SUBLINGUAL
Status: DISCONTINUED | OUTPATIENT
Start: 2024-11-07 | End: 2024-11-12 | Stop reason: HOSPADM

## 2024-11-07 RX ORDER — METOPROLOL TARTRATE 25 MG/1
25 TABLET, FILM COATED ORAL 2 TIMES DAILY WITH MEALS
Status: DISCONTINUED | OUTPATIENT
Start: 2024-11-08 | End: 2024-11-09

## 2024-11-07 RX ORDER — SODIUM CHLORIDE 0.9 % (FLUSH) 0.9 %
10 SYRINGE (ML) INJECTION AS NEEDED
Status: DISCONTINUED | OUTPATIENT
Start: 2024-11-07 | End: 2024-11-12 | Stop reason: HOSPADM

## 2024-11-07 RX ORDER — IOPAMIDOL 755 MG/ML
100 INJECTION, SOLUTION INTRAVASCULAR
Status: COMPLETED | OUTPATIENT
Start: 2024-11-07 | End: 2024-11-07

## 2024-11-07 RX ORDER — ENOXAPARIN SODIUM 100 MG/ML
30 INJECTION SUBCUTANEOUS DAILY
Status: DISCONTINUED | OUTPATIENT
Start: 2024-11-07 | End: 2024-11-07

## 2024-11-07 RX ORDER — NALOXONE HCL 0.4 MG/ML
0.4 VIAL (ML) INJECTION
Status: DISCONTINUED | OUTPATIENT
Start: 2024-11-07 | End: 2024-11-12 | Stop reason: HOSPADM

## 2024-11-07 RX ORDER — BISACODYL 10 MG
10 SUPPOSITORY, RECTAL RECTAL DAILY PRN
Status: DISCONTINUED | OUTPATIENT
Start: 2024-11-07 | End: 2024-11-07

## 2024-11-07 RX ORDER — PENTOXIFYLLINE 400 MG/1
400 TABLET, EXTENDED RELEASE ORAL 2 TIMES DAILY
Status: DISCONTINUED | OUTPATIENT
Start: 2024-11-08 | End: 2024-11-12 | Stop reason: HOSPADM

## 2024-11-07 RX ORDER — POLYETHYLENE GLYCOL 3350 17 G/17G
17 POWDER, FOR SOLUTION ORAL DAILY PRN
Status: DISCONTINUED | OUTPATIENT
Start: 2024-11-07 | End: 2024-11-12 | Stop reason: HOSPADM

## 2024-11-07 RX ORDER — ENOXAPARIN SODIUM 100 MG/ML
30 INJECTION SUBCUTANEOUS EVERY 24 HOURS
Status: DISCONTINUED | OUTPATIENT
Start: 2024-11-08 | End: 2024-11-12 | Stop reason: HOSPADM

## 2024-11-07 RX ORDER — AMOXICILLIN 250 MG
2 CAPSULE ORAL 2 TIMES DAILY
Status: DISCONTINUED | OUTPATIENT
Start: 2024-11-07 | End: 2024-11-12 | Stop reason: HOSPADM

## 2024-11-07 RX ORDER — PRAMIPEXOLE DIHYDROCHLORIDE 0.5 MG/1
0.5 TABLET ORAL NIGHTLY
Status: DISCONTINUED | OUTPATIENT
Start: 2024-11-08 | End: 2024-11-09

## 2024-11-07 RX ORDER — BISACODYL 5 MG/1
5 TABLET, DELAYED RELEASE ORAL DAILY PRN
Status: DISCONTINUED | OUTPATIENT
Start: 2024-11-07 | End: 2024-11-12 | Stop reason: HOSPADM

## 2024-11-07 RX ORDER — AMOXICILLIN 250 MG
2 CAPSULE ORAL 2 TIMES DAILY
Status: DISCONTINUED | OUTPATIENT
Start: 2024-11-07 | End: 2024-11-07

## 2024-11-07 RX ORDER — SULFAMETHOXAZOLE AND TRIMETHOPRIM 800; 160 MG/1; MG/1
1 TABLET ORAL 2 TIMES DAILY
COMMUNITY
End: 2024-11-12 | Stop reason: HOSPADM

## 2024-11-07 RX ORDER — POLYETHYLENE GLYCOL 3350 17 G/17G
17 POWDER, FOR SOLUTION ORAL DAILY PRN
Status: DISCONTINUED | OUTPATIENT
Start: 2024-11-07 | End: 2024-11-07

## 2024-11-07 RX ORDER — SODIUM CHLORIDE 0.9 % (FLUSH) 0.9 %
10 SYRINGE (ML) INJECTION EVERY 12 HOURS SCHEDULED
Status: DISCONTINUED | OUTPATIENT
Start: 2024-11-07 | End: 2024-11-07

## 2024-11-07 RX ORDER — LATANOPROST 50 UG/ML
1 SOLUTION/ DROPS OPHTHALMIC NIGHTLY
Status: DISCONTINUED | OUTPATIENT
Start: 2024-11-08 | End: 2024-11-12 | Stop reason: HOSPADM

## 2024-11-07 RX ORDER — ASPIRIN 81 MG/1
324 TABLET, CHEWABLE ORAL ONCE
Status: COMPLETED | OUTPATIENT
Start: 2024-11-07 | End: 2024-11-07

## 2024-11-07 RX ORDER — MIRTAZAPINE 15 MG/1
15 TABLET, FILM COATED ORAL NIGHTLY
Status: DISCONTINUED | OUTPATIENT
Start: 2024-11-08 | End: 2024-11-12 | Stop reason: HOSPADM

## 2024-11-07 RX ORDER — BISACODYL 5 MG/1
5 TABLET, DELAYED RELEASE ORAL DAILY PRN
Status: DISCONTINUED | OUTPATIENT
Start: 2024-11-07 | End: 2024-11-07

## 2024-11-07 RX ORDER — HYDROCODONE BITARTRATE AND ACETAMINOPHEN 5; 325 MG/1; MG/1
1 TABLET ORAL ONCE AS NEEDED
Status: COMPLETED | OUTPATIENT
Start: 2024-11-07 | End: 2024-11-07

## 2024-11-07 RX ORDER — METHOCARBAMOL 750 MG/1
750 TABLET, FILM COATED ORAL 4 TIMES DAILY
Status: DISCONTINUED | OUTPATIENT
Start: 2024-11-07 | End: 2024-11-07

## 2024-11-07 RX ORDER — BISACODYL 10 MG
10 SUPPOSITORY, RECTAL RECTAL DAILY PRN
Status: DISCONTINUED | OUTPATIENT
Start: 2024-11-07 | End: 2024-11-12 | Stop reason: HOSPADM

## 2024-11-07 RX ORDER — SODIUM CHLORIDE 9 MG/ML
40 INJECTION, SOLUTION INTRAVENOUS AS NEEDED
Status: DISCONTINUED | OUTPATIENT
Start: 2024-11-07 | End: 2024-11-07 | Stop reason: SDUPTHER

## 2024-11-07 RX ORDER — DICYCLOMINE HYDROCHLORIDE 10 MG/1
20 CAPSULE ORAL DAILY PRN
Status: DISCONTINUED | OUTPATIENT
Start: 2024-11-07 | End: 2024-11-12 | Stop reason: HOSPADM

## 2024-11-07 RX ORDER — LATANOPROST 50 UG/ML
1 SOLUTION/ DROPS OPHTHALMIC NIGHTLY
COMMUNITY

## 2024-11-07 RX ORDER — TRAMADOL HYDROCHLORIDE 50 MG/1
50 TABLET ORAL EVERY 12 HOURS PRN
Status: DISCONTINUED | OUTPATIENT
Start: 2024-11-07 | End: 2024-11-08

## 2024-11-07 RX ORDER — SODIUM CHLORIDE 9 MG/ML
40 INJECTION, SOLUTION INTRAVENOUS AS NEEDED
Status: DISCONTINUED | OUTPATIENT
Start: 2024-11-07 | End: 2024-11-12 | Stop reason: HOSPADM

## 2024-11-07 RX ORDER — NITROGLYCERIN 0.4 MG/1
0.4 TABLET SUBLINGUAL
Status: DISCONTINUED | OUTPATIENT
Start: 2024-11-07 | End: 2024-11-07 | Stop reason: SDUPTHER

## 2024-11-07 RX ORDER — CYCLOBENZAPRINE HCL 10 MG
10 TABLET ORAL 3 TIMES DAILY
Status: DISCONTINUED | OUTPATIENT
Start: 2024-11-08 | End: 2024-11-11

## 2024-11-07 RX ORDER — METHOCARBAMOL 500 MG/1
500 TABLET, FILM COATED ORAL EVERY 8 HOURS PRN
Status: DISCONTINUED | OUTPATIENT
Start: 2024-11-07 | End: 2024-11-07

## 2024-11-07 RX ORDER — DICYCLOMINE HCL 20 MG
20 TABLET ORAL AS NEEDED
COMMUNITY
End: 2024-11-12 | Stop reason: HOSPADM

## 2024-11-07 RX ORDER — FUROSEMIDE 10 MG/ML
40 INJECTION INTRAMUSCULAR; INTRAVENOUS ONCE
Status: COMPLETED | OUTPATIENT
Start: 2024-11-07 | End: 2024-11-07

## 2024-11-07 RX ORDER — SODIUM CHLORIDE 0.9 % (FLUSH) 0.9 %
10 SYRINGE (ML) INJECTION EVERY 12 HOURS SCHEDULED
Status: DISCONTINUED | OUTPATIENT
Start: 2024-11-07 | End: 2024-11-12 | Stop reason: HOSPADM

## 2024-11-07 RX ADMIN — MIRTAZAPINE 15 MG: 15 TABLET, FILM COATED ORAL at 23:51

## 2024-11-07 RX ADMIN — HYDROCODONE BITARTRATE AND ACETAMINOPHEN 1 TABLET: 5; 325 TABLET ORAL at 17:39

## 2024-11-07 RX ADMIN — HYDROMORPHONE HYDROCHLORIDE 0.25 MG: 1 INJECTION, SOLUTION INTRAMUSCULAR; INTRAVENOUS; SUBCUTANEOUS at 19:14

## 2024-11-07 RX ADMIN — IOPAMIDOL 100 ML: 755 INJECTION, SOLUTION INTRAVENOUS at 17:14

## 2024-11-07 RX ADMIN — PENTOXIFYLLINE 400 MG: 400 TABLET, EXTENDED RELEASE ORAL at 23:51

## 2024-11-07 RX ADMIN — LIDOCAINE 1 PATCH: 4 PATCH TOPICAL at 20:45

## 2024-11-07 RX ADMIN — FUROSEMIDE 40 MG: 10 INJECTION, SOLUTION INTRAMUSCULAR; INTRAVENOUS at 17:39

## 2024-11-07 RX ADMIN — ASPIRIN 81 MG CHEWABLE TABLET 324 MG: 81 TABLET CHEWABLE at 15:05

## 2024-11-07 RX ADMIN — PRAMIPEXOLE DIHYDROCHLORIDE 0.5 MG: 0.5 TABLET ORAL at 23:51

## 2024-11-07 RX ADMIN — TRAMADOL HYDROCHLORIDE 50 MG: 50 TABLET, COATED ORAL at 23:51

## 2024-11-07 RX ADMIN — SENNOSIDES AND DOCUSATE SODIUM 2 TABLET: 50; 8.6 TABLET ORAL at 20:43

## 2024-11-07 RX ADMIN — Medication 10 ML: at 20:31

## 2024-11-07 RX ADMIN — METHOCARBAMOL 500 MG: 500 TABLET ORAL at 20:43

## 2024-11-07 NOTE — ED PROVIDER NOTES
Subjective   History of Present Illness  89-year-old female presents with complaints of shortness of breath.  She states she recently was diagnosed with UTI.  She reports her legs have been painful and swelling for the last few days.  She is also noted mass on her chest for about 3 months.  She has had no fever.  No cough.  No vomiting or diarrhea.  Review of Systems    Past Medical History:   Diagnosis Date    Arthritis     Back pain     Breast lump     Leg cramps     Osteoporosis     Tremor        Allergies   Allergen Reactions    Morphine Hives and Rash    Ampicillin Hives       Past Surgical History:   Procedure Laterality Date    APPENDECTOMY      BREAST LUMPECTOMY      Left    ENDOSCOPY N/A 8/30/2022    Procedure: ESOPHAGOGASTRODUODENOSCOPY;  Surgeon: Jan Ortega MD;  Location: [facility] ENDOSCOPY;  Service: Gastroenterology;  Laterality: N/A;  normal    REPLACEMENT TOTAL KNEE      Right    TONSILLECTOMY         No family history on file.    Social History     Socioeconomic History    Marital status:    Tobacco Use    Smoking status: Never    Smokeless tobacco: Never   Substance and Sexual Activity    Alcohol use: Not Currently    Drug use: Not Currently    Sexual activity: Defer     Prior to Admission medications    Medication Sig Start Date End Date Taking? Authorizing Provider   aspirin 81 MG EC tablet Take 81 mg by mouth Daily.    Loli Franco MD   Calcium 500-100 MG-UNIT chewable tablet Chew 1 tablet Every 4 (Four) Hours As Needed.    Loli Franco MD   Denosumab (PROLIA SC) Inject  under the skin into the appropriate area as directed.    Loli Franco MD   metoprolol tartrate (LOPRESSOR) 25 MG tablet Take 25 mg by mouth 2 (Two) Times a Day With Meals. 1/4/21   Loli Franco MD   mirtazapine (REMERON) 15 MG tablet Take 15 mg by mouth.    Loli Franco MD   multivitamin (THERAGRAN) tablet tablet Take 1 tablet by mouth Daily.    Loli Franco MD    niacin (NIASPAN) 500 MG CR tablet Take 500 mg by mouth.    Loli Franco MD   pantoprazole (PROTONIX) 40 MG EC tablet Take 1 tablet by mouth Daily. 8/30/22   Jan Ortega MD   pentoxifylline (TRENtal) 400 MG CR tablet Take 400 mg by mouth 2 (Two) Times a Day. 7/18/17   Loli Franco MD   pramipexole (MIRAPEX) 0.5 MG tablet Take 0.5 mg by mouth 2 (Two) Times a Day. 1/28/21   Loli Franco MD   Teriparatide, Recombinant, (FORTEO) 600 MCG/2.4ML injection Inject 20 mcg under the skin into the appropriate area as directed Daily.    Loli Franco MD   traMADol (ULTRAM) 50 MG tablet Take 100 mg by mouth Every 6 (Six) Hours As Needed.    Loli Franco MD           Objective   Physical Exam  89-year-old female awake alert.  Generally well-developed well-nourished for age.  Neck supple chest clear cardiovascular regular rate and rhythm.  She has noted to have a firm mass right lower parasternal region.  Abdomen soft nontender.  Legs reveal 2-3+ symmetric edema.  There is mild erythema that appears to be dependent edema and not cellulitis.  Procedures           ED Course      Results for orders placed or performed during the hospital encounter of 11/07/24   ECG 12 Lead ED Triage Standing Order; Chest Pain    Collection Time: 11/07/24  2:36 PM   Result Value Ref Range    QT Interval 387 ms    QTC Interval 436 ms   Green Top (Gel)    Collection Time: 11/07/24  2:50 PM   Result Value Ref Range    Extra Tube Hold for add-ons.    Comprehensive Metabolic Panel    Collection Time: 11/07/24  2:51 PM    Specimen: Blood   Result Value Ref Range    Glucose 96 65 - 99 mg/dL    BUN 19 8 - 23 mg/dL    Creatinine 1.28 (H) 0.57 - 1.00 mg/dL    Sodium 133 (L) 136 - 145 mmol/L    Potassium 4.7 3.5 - 5.2 mmol/L    Chloride 97 (L) 98 - 107 mmol/L    CO2 21.8 (L) 22.0 - 29.0 mmol/L    Calcium 9.7 8.6 - 10.5 mg/dL    Total Protein 6.9 6.0 - 8.5 g/dL    Albumin 4.5 3.5 - 5.2 g/dL    ALT (SGPT) 18 1 - 33  U/L    AST (SGOT) 28 1 - 32 U/L    Alkaline Phosphatase 50 39 - 117 U/L    Total Bilirubin 0.3 0.0 - 1.2 mg/dL    Globulin 2.4 gm/dL    A/G Ratio 1.9 g/dL    BUN/Creatinine Ratio 14.8 7.0 - 25.0    Anion Gap 14.2 5.0 - 15.0 mmol/L    eGFR 40.1 (L) >60.0 mL/min/1.73   CBC Auto Differential    Collection Time: 11/07/24  2:51 PM    Specimen: Blood   Result Value Ref Range    WBC 5.88 3.40 - 10.80 10*3/mm3    RBC 3.97 3.77 - 5.28 10*6/mm3    Hemoglobin 11.0 (L) 12.0 - 15.9 g/dL    Hematocrit 34.5 34.0 - 46.6 %    MCV 86.9 79.0 - 97.0 fL    MCH 27.7 26.6 - 33.0 pg    MCHC 31.9 31.5 - 35.7 g/dL    RDW 13.5 12.3 - 15.4 %    RDW-SD 42.9 37.0 - 54.0 fl    MPV 9.1 6.0 - 12.0 fL    Platelets 229 140 - 450 10*3/mm3    Neutrophil % 77.9 (H) 42.7 - 76.0 %    Lymphocyte % 12.6 (L) 19.6 - 45.3 %    Monocyte % 7.0 5.0 - 12.0 %    Eosinophil % 1.9 0.3 - 6.2 %    Basophil % 0.3 0.0 - 1.5 %    Immature Grans % 0.3 0.0 - 0.5 %    Neutrophils, Absolute 4.58 1.70 - 7.00 10*3/mm3    Lymphocytes, Absolute 0.74 0.70 - 3.10 10*3/mm3    Monocytes, Absolute 0.41 0.10 - 0.90 10*3/mm3    Eosinophils, Absolute 0.11 0.00 - 0.40 10*3/mm3    Basophils, Absolute 0.02 0.00 - 0.20 10*3/mm3    Immature Grans, Absolute 0.02 0.00 - 0.05 10*3/mm3    nRBC 0.0 0.0 - 0.2 /100 WBC   BNP    Collection Time: 11/07/24  2:51 PM    Specimen: Blood   Result Value Ref Range    proBNP 946.0 0.0 - 1,800.0 pg/mL   Lavender Top    Collection Time: 11/07/24  2:51 PM   Result Value Ref Range    Extra Tube hold for add-on    Gold Top - SST    Collection Time: 11/07/24  2:51 PM   Result Value Ref Range    Extra Tube Hold for add-ons.    Light Blue Top    Collection Time: 11/07/24  2:51 PM   Result Value Ref Range    Extra Tube Hold for add-ons.    Urinalysis With Culture If Indicated - Straight Cath    Collection Time: 11/07/24  2:52 PM    Specimen: Straight Cath; Urine   Result Value Ref Range    Color, UA Yellow Yellow, Straw    Appearance, UA Clear Clear    pH, UA 6.0 5.0  - 8.0    Specific Gravity, UA 1.010 1.005 - 1.030    Glucose, UA Negative Negative    Ketones, UA Negative Negative    Bilirubin, UA Negative Negative    Blood, UA Trace (A) Negative    Protein, UA Negative Negative    Leuk Esterase, UA Trace (A) Negative    Nitrite, UA Negative Negative    Urobilinogen, UA 0.2 E.U./dL 0.2 - 1.0 E.U./dL   Urinalysis, Microscopic Only - Straight Cath    Collection Time: 11/07/24  2:52 PM    Specimen: Straight Cath; Urine   Result Value Ref Range    RBC, UA None Seen None Seen, 0-2 /HPF    WBC, UA 0-2 None Seen, 0-2 /HPF    Bacteria, UA None Seen None Seen /HPF    Squamous Epithelial Cells, UA 0-2 None Seen, 0-2 /HPF    Hyaline Casts, UA None Seen None Seen /LPF    Methodology Manual Light Microscopy    High Sensitivity Troponin T    Collection Time: 11/07/24  3:39 PM    Specimen: Blood   Result Value Ref Range    HS Troponin T 54 (C) <14 ng/L     CT Chest With Contrast Diagnostic   Final Result   Impression:   1. Locally invasive right chest wall mass involving costal cartilage and intercostal spaces centered at anterior fifth through seventh ribs. Findings concerning for malignancy, possibly of breast malignancy although differential may include sarcoma,    lymphoma or metastatic disease from other etiology. Correlate with tissue sampling.   2. No convincing adenopathy or suspicious pulmonary nodule in the chest.   3. Ill-defined hypodense lesion in the pancreatic head. Recommend abdominal MRI without and with IV contrast to exclude mass.   4. No acute airspace process. Chronic biapical pleural-parenchymal scarring.   5. Aortic and coronary atherosclerotic disease.   6. Cholelithiasis without findings of acute cholecystitis.   7. Other chronic/ancillary findings as above.            Electronically Signed: Carlos Flores MD     11/7/2024 5:38 PM EST     Workstation ID: [id number]      XR Chest 1 View   Final Result   Impression:   No active disease.         Electronically Signed:  "Cedric Henry MD     11/7/2024 3:40 PM EST     Workstation ID: [id number]          Medications   sodium chloride 0.9 % flush 10 mL (has no administration in time range)   aspirin chewable tablet 324 mg (324 mg Oral Given 11/7/24 1505)   iopamidol (ISOVUE-370) 76 % injection 100 mL (100 mL Intravenous Given 11/7/24 1714)   HYDROcodone-acetaminophen (NORCO) 5-325 MG per tablet 1 tablet (1 tablet Oral Given 11/7/24 1739)   furosemide (LASIX) injection 40 mg (40 mg Intravenous Given 11/7/24 1739)     /73   Pulse 96   Temp 99.5 °F (37.5 °C) (Rectal)   Resp 20   Ht 152.4 cm (60\")   Wt 58.1 kg (128 lb)   SpO2 96%   BMI 25.00 kg/m²                                              Medical Decision Making    Chart review: Patient had EGD for iron deficiency anemia found to have 2 clean-based chronic appearing ulcers 4 to 5 mm August 2022  Comorbidity: As per past history   Differential: Congestive heart failure, dependent edema, neoplasm  My EKG interpretation: Sinus rhythm with first-degree AV block anterior septal infarct age indeterminant.  Compared to previous change of anterior septal infarct now present.  Lab: Urinalysis 0-2 white cells no bacteria nitrite negative troponin mild elevation at 54 BNP normal 946 comprehensive metabolic panel BUN 19 creatinine 1.28.  CBC remarkable for hemoglobin of 11.  My Radiology review and interpretation: Chest x-ray reveals normal heart size pulmonary vasculature appears normal there is apical pleural thickening.  Lungs are otherwise clear.  CT chest with contrast reveals invasive right chest wall mass involving costal cartilage and interval costal spaces centered at the anterior 5th through 7th ribs.  No obvious adenopathy or suspicious pulmonary nodule in chest.  Ill-defined hypodense lesion at the pancreatic head.  There is cholelithiasis without findings of cholecystitis  Discussion/treatment: Patient had IV placed.  She was given Lasix 40 mg IV for her edema.  She was given " a Norco for pain.  Findings were discussed with her and family.  She will have a repeat troponin.  She was given aspirin.  Patient was discussed with Dr. Li will be admitted to hospitalist service for further evaluation and workup  Patient was evaluated using appropriate PPE    Final diagnoses:   Elevated troponin   Bilateral leg edema   Shortness of breath   Chest wall mass       ED Disposition  ED Disposition       ED Disposition   Decision to Admit    Condition   --    Comment   Level of Care: Telemetry [5]   Admitting Physician: DARRYL LI [[id number]]                 No follow-up provider specified.       Medication List      No changes were made to your prescriptions during this visit.            Jan Ogden MD  11/07/24 2607

## 2024-11-07 NOTE — Clinical Note
Level of Care: Telemetry [5]   Diagnosis: Lung cancer metastatic to bone [[id number]]   Admitting Physician: CAROLINE SANCHEZ [[id number]]

## 2024-11-07 NOTE — LETTER
EMS Transport Request  For use at [facility], [place], [place], [place], and [place] only   Patient Name: Juany Dalton : 1934   Weight:58.1 kg (128 lb) Pick-up Location: [facility] BLS/ALS: BLS/ALS: BLS   Insurance: MEDICARE Auth End Date: .   Pre-Cert #: D/C Summary complete:    [facility]   Contact Precautions: None   Equipment (O2, Fluids, etc.): None   Arrive By Date/Time: 24 Stretcher/WC: Wheelchair   CM Requesting: Elise Fuchs Yumiko Ext: [phone]   Notes/Medical Necessity: Max assist to transfer x 1-2. A/O. Nursing WILL CALL     ______________________________________________________________________    *Only 2 patient bags OR 1 carry-on size bag are permitted.  Wheelchairs and walkers CANNOT transported with the patient. Acknowledge: Yes

## 2024-11-08 ENCOUNTER — INPATIENT HOSPITAL (OUTPATIENT)
Age: 89
End: 2024-11-08
Payer: COMMERCIAL

## 2024-11-08 ENCOUNTER — APPOINTMENT (OUTPATIENT)
Dept: MRI IMAGING | Facility: HOSPITAL | Age: 89
DRG: 607 | End: 2024-11-08
Payer: MEDICARE

## 2024-11-08 ENCOUNTER — INPATIENT HOSPITAL (OUTPATIENT)
Dept: URBAN - METROPOLITAN AREA HOSPITAL 84 | Facility: HOSPITAL | Age: 89
End: 2024-11-08
Payer: COMMERCIAL

## 2024-11-08 ENCOUNTER — APPOINTMENT (OUTPATIENT)
Dept: CARDIOLOGY | Facility: HOSPITAL | Age: 89
DRG: 607 | End: 2024-11-08
Payer: MEDICARE

## 2024-11-08 DIAGNOSIS — E87.8 OTHER DISORDERS OF ELECTROLYTE AND FLUID BALANCE, NOT ELSEWH: ICD-10-CM

## 2024-11-08 DIAGNOSIS — D64.9 ANEMIA, UNSPECIFIED: ICD-10-CM

## 2024-11-08 DIAGNOSIS — K80.20 CALCULUS OF GALLBLADDER WITHOUT CHOLECYSTITIS WITHOUT OBSTRU: ICD-10-CM

## 2024-11-08 DIAGNOSIS — R93.3 ABNORMAL FINDINGS ON DIAGNOSTIC IMAGING OF OTHER PARTS OF DI: ICD-10-CM

## 2024-11-08 LAB
ACANTHOCYTES BLD QL SMEAR: ABNORMAL
ALBUMIN SERPL-MCNC: 3.8 G/DL (ref 3.5–5.2)
ALBUMIN/GLOB SERPL: 1.6 G/DL
ALP SERPL-CCNC: 43 U/L (ref 39–117)
ALT SERPL W P-5'-P-CCNC: 15 U/L (ref 1–33)
ANION GAP SERPL CALCULATED.3IONS-SCNC: 11.6 MMOL/L (ref 5–15)
ANISOCYTOSIS BLD QL: ABNORMAL
AORTIC DIMENSIONLESS INDEX: 0.64 (DI)
AST SERPL-CCNC: 30 U/L (ref 1–32)
BH CV ECHO LEFT VENTRICLE GLOBAL LONGITUDINAL STRAIN: -16 %
BH CV ECHO MEAS - ACS: 1.4 CM
BH CV ECHO MEAS - AI P1/2T: 465.5 MSEC
BH CV ECHO MEAS - AO MAX PG: 9.4 MMHG
BH CV ECHO MEAS - AO MEAN PG: 5 MMHG
BH CV ECHO MEAS - AO V2 MAX: 153 CM/SEC
BH CV ECHO MEAS - AO V2 VTI: 35.6 CM
BH CV ECHO MEAS - AVA(I,D): 1.64 CM2
BH CV ECHO MEAS - EDV(CUBED): 68.9 ML
BH CV ECHO MEAS - EDV(MOD-SP4): 67 ML
BH CV ECHO MEAS - EF(MOD-SP4): 60.9 %
BH CV ECHO MEAS - ESV(CUBED): 22 ML
BH CV ECHO MEAS - ESV(MOD-SP4): 26.2 ML
BH CV ECHO MEAS - FS: 31.7 %
BH CV ECHO MEAS - IVS/LVPW: 1 CM
BH CV ECHO MEAS - IVSD: 0.9 CM
BH CV ECHO MEAS - LA DIMENSION: 2.7 CM
BH CV ECHO MEAS - LAT PEAK E' VEL: 12.3 CM/SEC
BH CV ECHO MEAS - LV DIASTOLIC VOL/BSA (35-75): 43.4 CM2
BH CV ECHO MEAS - LV MASS(C)D: 114.1 GRAMS
BH CV ECHO MEAS - LV MAX PG: 3.8 MMHG
BH CV ECHO MEAS - LV MEAN PG: 2 MMHG
BH CV ECHO MEAS - LV SYSTOLIC VOL/BSA (12-30): 17 CM2
BH CV ECHO MEAS - LV V1 MAX: 98 CM/SEC
BH CV ECHO MEAS - LV V1 VTI: 22.9 CM
BH CV ECHO MEAS - LVIDD: 4.1 CM
BH CV ECHO MEAS - LVIDS: 2.8 CM
BH CV ECHO MEAS - LVOT AREA: 2.5 CM2
BH CV ECHO MEAS - LVOT DIAM: 1.8 CM
BH CV ECHO MEAS - LVPWD: 0.9 CM
BH CV ECHO MEAS - MED PEAK E' VEL: 6.6 CM/SEC
BH CV ECHO MEAS - MR MAX PG: 81 MMHG
BH CV ECHO MEAS - MR MAX VEL: 450 CM/SEC
BH CV ECHO MEAS - MV A MAX VEL: 107 CM/SEC
BH CV ECHO MEAS - MV DEC SLOPE: 340 CM/SEC2
BH CV ECHO MEAS - MV DEC TIME: 0.22 SEC
BH CV ECHO MEAS - MV E MAX VEL: 69.7 CM/SEC
BH CV ECHO MEAS - MV E/A: 0.65
BH CV ECHO MEAS - MV MAX PG: 5.8 MMHG
BH CV ECHO MEAS - MV MEAN PG: 2 MMHG
BH CV ECHO MEAS - MV P1/2T: 76.8 MSEC
BH CV ECHO MEAS - MV V2 VTI: 29.7 CM
BH CV ECHO MEAS - MVA(P1/2T): 2.9 CM2
BH CV ECHO MEAS - MVA(VTI): 1.96 CM2
BH CV ECHO MEAS - PA ACC TIME: 0.14 SEC
BH CV ECHO MEAS - PA V2 MAX: 119 CM/SEC
BH CV ECHO MEAS - RAP SYSTOLE: 8 MMHG
BH CV ECHO MEAS - RV MAX PG: 1.63 MMHG
BH CV ECHO MEAS - RV V1 MAX: 63.9 CM/SEC
BH CV ECHO MEAS - RV V1 VTI: 16.1 CM
BH CV ECHO MEAS - RVDD: 2.6 CM
BH CV ECHO MEAS - RVSP: 36.1 MMHG
BH CV ECHO MEAS - SV(LVOT): 58.3 ML
BH CV ECHO MEAS - SV(MOD-SP4): 40.8 ML
BH CV ECHO MEAS - SVI(LVOT): 37.7 ML/M2
BH CV ECHO MEAS - SVI(MOD-SP4): 26.4 ML/M2
BH CV ECHO MEAS - TAPSE (>1.6): 2.02 CM
BH CV ECHO MEAS - TR MAX PG: 28.1 MMHG
BH CV ECHO MEAS - TR MAX VEL: 265 CM/SEC
BH CV ECHO MEASUREMENTS AVERAGE E/E' RATIO: 7.38
BH CV LOWER VASCULAR LEFT COMMON FEMORAL AUGMENT: NORMAL
BH CV LOWER VASCULAR LEFT COMMON FEMORAL COMPETENT: NORMAL
BH CV LOWER VASCULAR LEFT COMMON FEMORAL COMPRESS: NORMAL
BH CV LOWER VASCULAR LEFT COMMON FEMORAL PHASIC: NORMAL
BH CV LOWER VASCULAR LEFT COMMON FEMORAL SPONT: NORMAL
BH CV LOWER VASCULAR LEFT DISTAL FEMORAL COMPRESS: NORMAL
BH CV LOWER VASCULAR LEFT GASTRONEMIUS COMPRESS: NORMAL
BH CV LOWER VASCULAR LEFT GREATER SAPH AK COMPRESS: NORMAL
BH CV LOWER VASCULAR LEFT GREATER SAPH BK COMPRESS: NORMAL
BH CV LOWER VASCULAR LEFT LESSER SAPH COMPRESS: NORMAL
BH CV LOWER VASCULAR LEFT MID FEMORAL AUGMENT: NORMAL
BH CV LOWER VASCULAR LEFT MID FEMORAL COMPETENT: NORMAL
BH CV LOWER VASCULAR LEFT MID FEMORAL COMPRESS: NORMAL
BH CV LOWER VASCULAR LEFT MID FEMORAL PHASIC: NORMAL
BH CV LOWER VASCULAR LEFT MID FEMORAL SPONT: NORMAL
BH CV LOWER VASCULAR LEFT PERONEAL COMPRESS: NORMAL
BH CV LOWER VASCULAR LEFT POPLITEAL AUGMENT: NORMAL
BH CV LOWER VASCULAR LEFT POPLITEAL COMPETENT: NORMAL
BH CV LOWER VASCULAR LEFT POPLITEAL COMPRESS: NORMAL
BH CV LOWER VASCULAR LEFT POPLITEAL PHASIC: NORMAL
BH CV LOWER VASCULAR LEFT POPLITEAL SPONT: NORMAL
BH CV LOWER VASCULAR LEFT POSTERIOR TIBIAL COMPRESS: NORMAL
BH CV LOWER VASCULAR LEFT PROXIMAL FEMORAL COMPRESS: NORMAL
BH CV LOWER VASCULAR LEFT SAPHENOFEMORAL JUNCTION COMPRESS: NORMAL
BH CV LOWER VASCULAR RIGHT COMMON FEMORAL AUGMENT: NORMAL
BH CV LOWER VASCULAR RIGHT COMMON FEMORAL COMPETENT: NORMAL
BH CV LOWER VASCULAR RIGHT COMMON FEMORAL COMPRESS: NORMAL
BH CV LOWER VASCULAR RIGHT COMMON FEMORAL PHASIC: NORMAL
BH CV LOWER VASCULAR RIGHT COMMON FEMORAL SPONT: NORMAL
BH CV LOWER VASCULAR RIGHT DISTAL FEMORAL COMPRESS: NORMAL
BH CV LOWER VASCULAR RIGHT GREATER SAPH AK COMPRESS: NORMAL
BH CV LOWER VASCULAR RIGHT GREATER SAPH BK COMPRESS: NORMAL
BH CV LOWER VASCULAR RIGHT MID FEMORAL AUGMENT: NORMAL
BH CV LOWER VASCULAR RIGHT MID FEMORAL COMPETENT: NORMAL
BH CV LOWER VASCULAR RIGHT MID FEMORAL COMPRESS: NORMAL
BH CV LOWER VASCULAR RIGHT MID FEMORAL PHASIC: NORMAL
BH CV LOWER VASCULAR RIGHT MID FEMORAL SPONT: NORMAL
BH CV LOWER VASCULAR RIGHT PERONEAL COMPRESS: NORMAL
BH CV LOWER VASCULAR RIGHT POPLITEAL AUGMENT: NORMAL
BH CV LOWER VASCULAR RIGHT POPLITEAL COMPETENT: NORMAL
BH CV LOWER VASCULAR RIGHT POPLITEAL COMPRESS: NORMAL
BH CV LOWER VASCULAR RIGHT POPLITEAL PHASIC: NORMAL
BH CV LOWER VASCULAR RIGHT POPLITEAL SPONT: NORMAL
BH CV LOWER VASCULAR RIGHT POSTERIOR TIBIAL COMPRESS: NORMAL
BH CV LOWER VASCULAR RIGHT PROXIMAL FEMORAL COMPRESS: NORMAL
BH CV LOWER VASCULAR RIGHT SAPHENOFEMORAL JUNCTION COMPRESS: NORMAL
BH CV XLRA - TDI S': 13.2 CM/SEC
BILIRUB SERPL-MCNC: 0.4 MG/DL (ref 0–1.2)
BUN SERPL-MCNC: 20 MG/DL (ref 8–23)
BUN/CREAT SERPL: 13.7 (ref 7–25)
BURR CELLS BLD QL SMEAR: ABNORMAL
CALCIUM SPEC-SCNC: 9 MG/DL (ref 8.6–10.5)
CANCER AG19-9 SERPL-ACNC: 43.4 U/ML
CHLORIDE SERPL-SCNC: 99 MMOL/L (ref 98–107)
CO2 SERPL-SCNC: 22.4 MMOL/L (ref 22–29)
CREAT SERPL-MCNC: 1.46 MG/DL (ref 0.57–1)
D DIMER PPP FEU-MCNC: 1.92 MCGFEU/ML (ref 0–0.89)
DACRYOCYTES BLD QL SMEAR: ABNORMAL
DEPRECATED RDW RBC AUTO: 42 FL (ref 37–54)
EGFRCR SERPLBLD CKD-EPI 2021: 34.3 ML/MIN/1.73
ERYTHROCYTE [DISTWIDTH] IN BLOOD BY AUTOMATED COUNT: 13.4 % (ref 12.3–15.4)
GLOBULIN UR ELPH-MCNC: 2.4 GM/DL
GLUCOSE SERPL-MCNC: 90 MG/DL (ref 65–99)
HCT VFR BLD AUTO: 30.7 % (ref 34–46.6)
HGB BLD-MCNC: 9.9 G/DL (ref 12–15.9)
LYMPHOCYTES # BLD MANUAL: 1.21 10*3/MM3 (ref 0.7–3.1)
LYMPHOCYTES NFR BLD MANUAL: 3 % (ref 5–12)
MAGNESIUM SERPL-MCNC: 2.2 MG/DL (ref 1.6–2.4)
MCH RBC QN AUTO: 27.6 PG (ref 26.6–33)
MCHC RBC AUTO-ENTMCNC: 32.2 G/DL (ref 31.5–35.7)
MCV RBC AUTO: 85.5 FL (ref 79–97)
MICROCYTES BLD QL: ABNORMAL
MONOCYTES # BLD: 0.18 10*3/MM3 (ref 0.1–0.9)
NEUTROPHILS # BLD AUTO: 4.65 10*3/MM3 (ref 1.7–7)
NEUTROPHILS NFR BLD MANUAL: 72 % (ref 42.7–76)
NEUTS BAND NFR BLD MANUAL: 5 % (ref 0–5)
PHOSPHATE SERPL-MCNC: 4.6 MG/DL (ref 2.5–4.5)
PLATELET # BLD AUTO: 191 10*3/MM3 (ref 140–450)
PMV BLD AUTO: 9.1 FL (ref 6–12)
POIKILOCYTOSIS BLD QL SMEAR: ABNORMAL
POTASSIUM SERPL-SCNC: 4.2 MMOL/L (ref 3.5–5.2)
PROT SERPL-MCNC: 6.2 G/DL (ref 6–8.5)
QT INTERVAL: 387 MS
QTC INTERVAL: 436 MS
RBC # BLD AUTO: 3.59 10*6/MM3 (ref 3.77–5.28)
SINUS: 2.8 CM
SMALL PLATELETS BLD QL SMEAR: ADEQUATE
SODIUM SERPL-SCNC: 133 MMOL/L (ref 136–145)
VARIANT LYMPHS NFR BLD MANUAL: 1 % (ref 0–5)
VARIANT LYMPHS NFR BLD MANUAL: 19 % (ref 19.6–45.3)
WBC MORPH BLD: NORMAL
WBC NRBC COR # BLD AUTO: 6.04 10*3/MM3 (ref 3.4–10.8)

## 2024-11-08 PROCEDURE — 86301 IMMUNOASSAY TUMOR CA 19-9: CPT | Performed by: NURSE PRACTITIONER

## 2024-11-08 PROCEDURE — 97166 OT EVAL MOD COMPLEX 45 MIN: CPT

## 2024-11-08 PROCEDURE — 25010000002 ENOXAPARIN PER 10 MG

## 2024-11-08 PROCEDURE — 97162 PT EVAL MOD COMPLEX 30 MIN: CPT

## 2024-11-08 PROCEDURE — 93970 EXTREMITY STUDY: CPT

## 2024-11-08 PROCEDURE — 99223 1ST HOSP IP/OBS HIGH 75: CPT | Mod: FS | Performed by: NURSE PRACTITIONER

## 2024-11-08 PROCEDURE — 93306 TTE W/DOPPLER COMPLETE: CPT

## 2024-11-08 PROCEDURE — 83735 ASSAY OF MAGNESIUM: CPT

## 2024-11-08 PROCEDURE — 93306 TTE W/DOPPLER COMPLETE: CPT | Performed by: INTERNAL MEDICINE

## 2024-11-08 PROCEDURE — 93356 MYOCRD STRAIN IMG SPCKL TRCK: CPT | Performed by: INTERNAL MEDICINE

## 2024-11-08 PROCEDURE — 85027 COMPLETE CBC AUTOMATED: CPT

## 2024-11-08 PROCEDURE — 93356 MYOCRD STRAIN IMG SPCKL TRCK: CPT

## 2024-11-08 PROCEDURE — 25010000002 HYDROMORPHONE PER 4 MG: Performed by: STUDENT IN AN ORGANIZED HEALTH CARE EDUCATION/TRAINING PROGRAM

## 2024-11-08 PROCEDURE — 85379 FIBRIN DEGRADATION QUANT: CPT | Performed by: STUDENT IN AN ORGANIZED HEALTH CARE EDUCATION/TRAINING PROGRAM

## 2024-11-08 PROCEDURE — 85007 BL SMEAR W/DIFF WBC COUNT: CPT

## 2024-11-08 PROCEDURE — 74181 MRI ABDOMEN W/O CONTRAST: CPT

## 2024-11-08 PROCEDURE — 93970 EXTREMITY STUDY: CPT | Performed by: SURGERY

## 2024-11-08 PROCEDURE — 80053 COMPREHEN METABOLIC PANEL: CPT

## 2024-11-08 PROCEDURE — 84100 ASSAY OF PHOSPHORUS: CPT

## 2024-11-08 PROCEDURE — 25810000003 LACTATED RINGERS SOLUTION: Performed by: STUDENT IN AN ORGANIZED HEALTH CARE EDUCATION/TRAINING PROGRAM

## 2024-11-08 PROCEDURE — 25010000002 LORAZEPAM PER 2 MG: Performed by: NURSE PRACTITIONER

## 2024-11-08 RX ORDER — HYDROMORPHONE HYDROCHLORIDE 1 MG/ML
0.25 INJECTION, SOLUTION INTRAMUSCULAR; INTRAVENOUS; SUBCUTANEOUS EVERY 4 HOURS PRN
Status: DISCONTINUED | OUTPATIENT
Start: 2024-11-08 | End: 2024-11-12 | Stop reason: HOSPADM

## 2024-11-08 RX ORDER — FUROSEMIDE 40 MG/1
40 TABLET ORAL DAILY
Status: DISCONTINUED | OUTPATIENT
Start: 2024-11-08 | End: 2024-11-08

## 2024-11-08 RX ORDER — LORAZEPAM 2 MG/ML
0.5 INJECTION INTRAMUSCULAR ONCE
Status: COMPLETED | OUTPATIENT
Start: 2024-11-08 | End: 2024-11-08

## 2024-11-08 RX ORDER — MIDODRINE HYDROCHLORIDE 5 MG/1
10 TABLET ORAL
Status: DISCONTINUED | OUTPATIENT
Start: 2024-11-08 | End: 2024-11-09

## 2024-11-08 RX ORDER — ALBUMIN (HUMAN) 12.5 G/50ML
25 SOLUTION INTRAVENOUS ONCE
Status: DISCONTINUED | OUTPATIENT
Start: 2024-11-08 | End: 2024-11-08

## 2024-11-08 RX ORDER — ALBUMIN HUMAN 50 G/1000ML
250 SOLUTION INTRAVENOUS ONCE
Status: DISCONTINUED | OUTPATIENT
Start: 2024-11-08 | End: 2024-11-09

## 2024-11-08 RX ADMIN — CYCLOBENZAPRINE 10 MG: 10 TABLET, FILM COATED ORAL at 21:59

## 2024-11-08 RX ADMIN — LIDOCAINE 1 PATCH: 4 PATCH TOPICAL at 21:59

## 2024-11-08 RX ADMIN — HYDROMORPHONE HYDROCHLORIDE 0.25 MG: 1 INJECTION, SOLUTION INTRAMUSCULAR; INTRAVENOUS; SUBCUTANEOUS at 17:59

## 2024-11-08 RX ADMIN — SODIUM CHLORIDE, POTASSIUM CHLORIDE, SODIUM LACTATE AND CALCIUM CHLORIDE 250 ML: 600; 310; 30; 20 INJECTION, SOLUTION INTRAVENOUS at 16:29

## 2024-11-08 RX ADMIN — MIRTAZAPINE 15 MG: 15 TABLET, FILM COATED ORAL at 22:03

## 2024-11-08 RX ADMIN — FUROSEMIDE 40 MG: 40 TABLET ORAL at 14:23

## 2024-11-08 RX ADMIN — PENTOXIFYLLINE 400 MG: 400 TABLET, EXTENDED RELEASE ORAL at 21:59

## 2024-11-08 RX ADMIN — MIDODRINE HYDROCHLORIDE 10 MG: 5 TABLET ORAL at 17:46

## 2024-11-08 RX ADMIN — CYCLOBENZAPRINE 10 MG: 10 TABLET, FILM COATED ORAL at 16:31

## 2024-11-08 RX ADMIN — Medication 10 ML: at 21:59

## 2024-11-08 RX ADMIN — METOPROLOL TARTRATE 25 MG: 25 TABLET, FILM COATED ORAL at 07:53

## 2024-11-08 RX ADMIN — LORAZEPAM 0.5 MG: 2 INJECTION INTRAMUSCULAR; INTRAVENOUS at 19:44

## 2024-11-08 RX ADMIN — PENTOXIFYLLINE 400 MG: 400 TABLET, EXTENDED RELEASE ORAL at 08:00

## 2024-11-08 RX ADMIN — CYCLOBENZAPRINE 10 MG: 10 TABLET, FILM COATED ORAL at 07:53

## 2024-11-08 RX ADMIN — Medication 10 ML: at 07:52

## 2024-11-08 RX ADMIN — METOPROLOL TARTRATE 25 MG: 25 TABLET, FILM COATED ORAL at 17:46

## 2024-11-08 RX ADMIN — SENNOSIDES AND DOCUSATE SODIUM 2 TABLET: 50; 8.6 TABLET ORAL at 21:59

## 2024-11-08 RX ADMIN — ENOXAPARIN SODIUM 30 MG: 100 INJECTION SUBCUTANEOUS at 16:31

## 2024-11-08 RX ADMIN — PRAMIPEXOLE DIHYDROCHLORIDE 0.5 MG: 0.5 TABLET ORAL at 22:03

## 2024-11-08 RX ADMIN — SENNOSIDES AND DOCUSATE SODIUM 2 TABLET: 50; 8.6 TABLET ORAL at 07:53

## 2024-11-08 NOTE — THERAPY EVALUATION
Patient Name: Juany Dalton  : 1934    MRN: [id number]                              Today's Date: 2024       Admit Date: 2024    Visit Dx:     ICD-10-CM ICD-9-CM   1. Elevated troponin  R79.89 790.6   2. Bilateral leg edema  R60.0 782.3   3. Shortness of breath  R06.02 786.05   4. Chest wall mass  R22.2 786.6     Patient Active Problem List   Diagnosis    Major depressive disorder, single episode, mild    Shortness of breath    Leg swelling    Chronic midline low back pain without sciatica     Past Medical History:   Diagnosis Date    Arthritis     Back pain     Breast lump     Hypertension     Leg cramps     Osteoporosis     Tremor      Past Surgical History:   Procedure Laterality Date    APPENDECTOMY      BREAST LUMPECTOMY      Left    ENDOSCOPY N/A 2022    Procedure: ESOPHAGOGASTRODUODENOSCOPY;  Surgeon: Jan Ortega MD;  Location: [facility] ENDOSCOPY;  Service: Gastroenterology;  Laterality: N/A;  normal    REPLACEMENT TOTAL KNEE      Right    TONSILLECTOMY        General Information       [facility] Name 24 1255          Physical Therapy Time and Intention    Document Type evaluation  -     Mode of Treatment physical therapy  -[facility] Name 24 1255          General Information    Patient Profile Reviewed yes  -     Prior Level of Function independent:;all household mobility;ADL's  dtr assists with bathing 1x/wk in the shower (with shower chair use), otherwise pt takes sink baths. Pt walks with a rollator between rooms. Needing increased encouragement for regular mobility in the home. no falls. Has a medical alert.  -     Barriers to Rehab previous functional deficit;medically complex  -       Row Name 24 1255          Living Environment    People in Home child(erin), adult  lives with dtr and son in law.  -       Row Name 24 1255          Home Main Entrance    Number of Stairs, Main Entrance one  -       Row Name 24 1255          Stairs Within  Home, Primary    Number of Stairs, Within Home, Primary none  -       Row Name 11/08/24 1255          Cognition    Orientation Status (Cognition) oriented x 3  -       Row Name 11/08/24 1255          Safety Issues/Impairments Affecting Functional Mobility    Impairments Affecting Function (Mobility) balance;strength;endurance/activity tolerance;pain;range of motion (ROM)  -               User Key  (r) = Recorded By, (t) = Taken By, (c) = Cosigned By      Initials Name Provider Type     Della De La O, QUE Physical Therapist                   Mobility       Row Name 11/08/24 1304          Bed Mobility    Comment, (Bed Mobility) not tested - pt up in chair at beginning and end of session.  -       Row Name 11/08/24 1304          Sit-Stand Transfer    Sit-Stand Schleicher (Transfers) minimum assist (75% patient effort)  -     Assistive Device (Sit-Stand Transfers) walker, front-wheeled  -     Comment, (Sit-Stand Transfer) increased time and effort. modA to steady posterior LOB upon standing.  -[facility] Name 11/08/24 1304          Gait/Stairs (Locomotion)    Schleicher Level (Gait) moderate assist (50% patient effort)  -     Assistive Device (Gait) walker, front-wheeled  -     Patient was able to Ambulate yes  -     Distance in Feet (Gait) 1  -     Deviations/Abnormal Patterns (Gait) base of support, wide;festinating/shuffling;stride length decreased  -     Comment, (Gait/Stairs) Pt with difficulty wt shifting and clearing feet. modA needed for lateral wt shift then pt is able to complete very small shuffle steps anterior/posterior.  -               User Key  (r) = Recorded By, (t) = Taken By, (c) = Cosigned By      Initials Name Provider Type    Della Alvarado, QUE Physical Therapist                   Obj/Interventions       Row Name 11/08/24 1306          Range of Motion Comprehensive    General Range of Motion lower extremity range of motion deficits identified  -     Comment,  General Range of Motion AROM Hips limited ~50%. knees WFL. ankles limited at end range by edema.  -[facility] Name 11/08/24 1306          Strength Comprehensive (MMT)    General Manual Muscle Testing (MMT) Assessment lower extremity strength deficits identified  -     Comment, General Manual Muscle Testing (MMT) Assessment MMT seated BLE hip flexion 3-/5, knees 3/5, ankles 3-/5.  -[facility] Name 11/08/24 1306          Motor Skills    Motor Skills functional endurance  -     Functional Endurance poor  -[facility] Name 11/08/24 1306          Balance    Balance Assessment sitting static balance;sitting dynamic balance;standing static balance;standing dynamic balance  -     Static Sitting Balance standby assist  -     Dynamic Sitting Balance standby assist  -     Position, Sitting Balance sitting in chair  at edge of chair with BUE support.  -     Static Standing Balance minimal assist  -     Dynamic Standing Balance moderate assist  -     Position/Device Used, Standing Balance walker, front-wheeled  -     Comment, Balance posterior LOB in standing  which pt is able to feel but is unable to self correct.  -               User Key  (r) = Recorded By, (t) = Taken By, (c) = Cosigned By      Initials Name Provider Type     Della De La O, PT Physical Therapist                   Goals/Plan       [facility] Name 11/08/24 1319          Bed Mobility Goal 1 (PT)    Activity/Assistive Device (Bed Mobility Goal 1, PT) bed mobility activities, all  -     [place] Level/Cues Needed (Bed Mobility Goal 1, PT) modified independence  -     Time Frame (Bed Mobility Goal 1, PT) long term goal (LTG);2 weeks  -[facility] Name 11/08/24 1319          Transfer Goal 1 (PT)    Activity/Assistive Device (Transfer Goal 1, PT) transfers, all;walker, rolling  -     [place] Level/Cues Needed (Transfer Goal 1, PT) modified independence  -     Time Frame (Transfer Goal 1, PT) long term goal (LTG);2 weeks  -        [facility] Name 11/08/24 1319          Gait Training Goal 1 (PT)    Activity/Assistive Device (Gait Training Goal 1, PT) gait (walking locomotion);assistive device use;walker, rolling  -     [place] Level (Gait Training Goal 1, PT) modified independence  -AH     Time Frame (Gait Training Goal 1, PT) long term goal (LTG);2 weeks  -[facility] Name 11/08/24 1313          Balance Goal 1 (PT)    Activity/Assistive Device (Balance Goal) standing static balance;standing dynamic balance;sit to stand dynamic balance;walker, rolling  -     [place] Level/Cues Needed (Balance Goal 1, PT) modified independence  -AH     Time Frame (Balance Goal 1, PT) long-term goal (LTG)  -AH       Row Name 11/08/24 6348          Therapy Assessment/Plan (PT)    Planned Therapy Interventions (PT) balance training;bed mobility training;gait training;home exercise program;strengthening;patient/family education;transfer training  [facility]               User Key  (r) = Recorded By, (t) = Taken By, (c) = Cosigned By      Initials Name Provider Type     Della De La O, PT Physical Therapist                   Clinical Impression       [facility] Name 11/08/24 1303          Pain    Additional Documentation Pain Scale: FACES Pre/Post-Treatment (Group)  -AH       Row Name 11/08/24 0397          Pain Scale: FACES Pre/Post-Treatment    Pain: FACES Scale, Pretreatment 4-->hurts little more  -     Posttreatment Pain Rating 4-->hurts little more  -     Pre/Posttreatment Pain Comment BLE spasms. Also chest wall mass is tender to touch.  -[facility] Name 11/08/24 6828          Plan of Care Review    Plan of Care Reviewed With patient;child  -     Outcome Evaluation Pt is an 88 y/o female who presented from home with SOA and BLE edema. Incidental finding of pancreatic lesion. Also with chest wall mass  (involving ribs 5 through 7 on R side) which has increased in size in the last 3 months. Pt has recurrent UTIs and is being treated for one currently.  Pt lives with her dtr and JUAQUIN in a H with 1 HAR[address]. She is usually alone during the day M-F while her dtr works. She has a life alert and is able to walk short distances unassisted with her rollator. Pt presents today below her baseline. She needs Larissa to stand up from recliner, then modA  to correct posterior lean/LOB upon standing with RW use. She is only able to take 3 shuffle steps, with modA needed from therapist to assist with wt shift to offload stepping LE. Pt is far below her baseline and is not safe to DC home at this time (due to increased assist needs and lack of 24/7 care available). PT recommends DC to SNF. PT will follow.  -       Row Name 11/08/24 1309          Therapy Assessment/Plan (PT)    Rehab Potential (PT) good  -     Criteria for Skilled Interventions Met (PT) yes;meets criteria  -     Therapy Frequency (PT) 5 times/wk  -     Predicted Duration of Therapy Intervention (PT) until DC or goals met.  -       Row Name 11/08/24 1309          Vital Signs    Intra Systolic BP Rehab 107  -     Intra Treatment Diastolic BP 44  -AH     Intratreatment Heart Rate (beats/min) 68  -     Intra SpO2 (%) 98  -     O2 Delivery Intra Treatment room air  -     Intra Patient Position Sitting  -       Row Name 11/08/24 1309          Positioning and Restraints    Post Treatment Position chair  -     In Chair reclined;call light within reach;encouraged to call for assist;with nsg;with family/caregiver  RN notified of need for chair alarm  -               User Key  (r) = Recorded By, (t) = Taken By, (c) = Cosigned By      Initials Name Provider Type     Della De La O, PT Physical Therapist                   Outcome Measures       Row Name 11/08/24 1320 11/08/24 0800       How much help from another person do you currently need...    Turning from your back to your side while in flat bed without using bedrails? 2  - 2  -AT    Moving from lying on back to sitting on the side of a flat bed  without bedrails? 2  - 2  -AT    Moving to and from a bed to a chair (including a wheelchair)? 2  - 2  -AT    Standing up from a chair using your arms (e.g., wheelchair, bedside chair)? 2  - 2  -AT    Climbing 3-5 steps with a railing? 1  - 2  -AT    To walk in hospital room? 2  -AH 2  -AT    AM-PAC 6 Clicks Score (PT) 11  - 12  -AT    Highest Level of Mobility Goal 4 --> Transfer to chair/commode  - 4 --> Transfer to chair/commode  -AT      Row Name 11/08/24 1320          Functional Assessment    Outcome Measure Options AM-PAC 6 Clicks Basic Mobility (PT)  -               User Key  (r) = Recorded By, (t) = Taken By, (c) = Cosigned By      Initials Name Provider Type    AT Jody Cline LPN Licensed Nurse    Della Alvarado, PT Physical Therapist                                 Physical Therapy Education       Title: PT OT SLP Therapies (Done)       Topic: Physical Therapy (Done)       Point: Mobility training (Done)       Learning Progress Summary            Patient Acceptance, E, VU by  at 11/8/2024 1321   Family Acceptance, E, VU by  at 11/8/2024 1321                      Point: Home exercise program (Done)       Learning Progress Summary            Patient Acceptance, E, VU by  at 11/8/2024 1321   Family Acceptance, E, VU by  at 11/8/2024 1321                      Point: Body mechanics (Done)       Learning Progress Summary            Patient Acceptance, E, VU by  at 11/8/2024 1321   Family Acceptance, E, VU by  at 11/8/2024 1321                      Point: Precautions (Done)       Learning Progress Summary            Patient Acceptance, E, VU by  at 11/8/2024 1321   Family Acceptance, E, VU by  at 11/8/2024 1321                                      User Key       Initials Effective Dates Name Provider Type Discipline     12/04/23 -  Della De La O, QUE Physical Therapist PT                  PT Recommendation and Plan  Planned Therapy Interventions (PT): balance training, bed  mobility training, gait training, home exercise program, strengthening, patient/family education, transfer training  Outcome Evaluation: Pt is an 88 y/o female who presented from home with SOA and BLE edema. Incidental finding of pancreatic lesion. Also with chest wall mass  (involving ribs 5 through 7 on R side) which has increased in size in the last 3 months. Pt has recurrent UTIs and is being treated for one currently. Pt lives with her dtr and JUAQUIN in a H with 1 [address]. She is usually alone during the day M-F while her dtr works. She has a life alert and is able to walk short distances unassisted with her rollator. Pt presents today below her baseline. She needs Larissa to stand up from recliner, then modA  to correct posterior lean/LOB upon standing with RW use. She is only able to take 3 shuffle steps, with modA needed from therapist to assist with wt shift to offload stepping LE. Pt is far below her baseline and is not safe to DC home at this time (due to increased assist needs and lack of 24/7 care available). PT recommends DC to SNF. PT will follow.     Time Calculation:         PT Charges       Row Name 11/08/24 1321             Time Calculation    Start Time 1009  -      Stop Time 1019  and 0808-9652. 21 minutes total  -      Time Calculation (min) 10 min  -      PT Non-Billable Time (min) 0 min  -      PT Received On 11/08/24  -      PT - Next Appointment 11/09/24  -      PT Goal Re-Cert Due Date 11/22/24  -         Time Calculation- PT    Total Timed Code Minutes- PT 0 minute(s)  -                User Key  (r) = Recorded By, (t) = Taken By, (c) = Cosigned By      Initials Name Provider Type     Della De La O, PT Physical Therapist                  Therapy Charges for Today       Code Description Service Date Service Provider Modifiers Qty    20351205535 HC PT EVAL MOD COMPLEXITY 4 11/8/2024 Della De La O, PT GP 1            PT G-Codes  Outcome Measure Options: AM-PAC 6 Clicks Basic  Mobility (PT)  AM-PAC 6 Clicks Score (PT): 11  PT Discharge Summary  Anticipated Discharge Disposition (PT): skilled nursing facility    Della De La O, PT  11/8/2024

## 2024-11-08 NOTE — H&P
[facility]  History & Physical    Patient Name: Juany Dalton  : 1934  MRN: [id number]  Primary Care Physician:  Orion Salmeron MD  Date of admission: 2024  Date and Time of Service: 2024 at 2000    Subjective      Chief Complaint: shortness of breath and leg swelling    History of Present Illness: Juany Dalton is a 89 y.o. female with a CMH of chronic back pain, osteoporosis, arthritis, history of left breast lumpectomy (in the 70s) that was benign, lower extremity lymphedema, growing chest mass x 3 months who presented to [facility] on 2024 with shortness of breath and bilateral lower extremity edema progressively gotten worse in the past week.  His night sweats, weight loss, cough, chest pain, nausea vomiting or diarrhea.    Of note, patient noticed a mid chest mass in August that her PCP suspected to be a bony protrusion.  She saw her PCP in the past week for a UTI and her PCP ordered a chest CT due to increased growth of the mass.      Pertinent ED findings: 181/82, P1, 20, 98.1 °F, 96% on room air.  Creatinine 1.28, hemoglobin 11.  CT chest: Invasive right chest wall mass measuring 3.1 x 4.2 x 10 cm involving the intercostal spaces and ribs concerning for malignancy breast. Hypodense  lesion of the pancreatic head.  No consolidation, effusion noted.  EKG showed sinus rhythm with first-degree AV block.  QTc 436 MS. received Lasix IV and analgesics.        Review of Systems   Constitutional:  Negative for activity change, appetite change, chills, fatigue and fever.   HENT:  Negative for congestion, drooling, hearing loss, nosebleeds, sinus pressure, sinus pain, sneezing, sore throat, tinnitus and trouble swallowing.    Respiratory:  Positive for shortness of breath. Negative for chest tightness and wheezing.    Cardiovascular:  Positive for leg swelling. Negative for chest pain and palpitations.   Gastrointestinal:  Negative for abdominal  distention, abdominal pain, constipation, diarrhea and nausea.   Genitourinary:  Negative for difficulty urinating, dysuria and flank pain.   Musculoskeletal:  Negative for arthralgias, back pain, gait problem, joint swelling, myalgias, neck pain and neck stiffness.   Skin:  Negative for color change, pallor and rash.   Neurological:  Negative for dizziness, seizures, syncope, weakness, light-headedness, numbness and headaches.   Psychiatric/Behavioral:  Negative for agitation and behavioral problems.        Personal History     Past Medical History:   Diagnosis Date    Arthritis     Back pain     Breast lump     Leg cramps     Osteoporosis     Tremor        Past Surgical History:   Procedure Laterality Date    APPENDECTOMY      BREAST LUMPECTOMY      Left    ENDOSCOPY N/A 8/30/2022    Procedure: ESOPHAGOGASTRODUODENOSCOPY;  Surgeon: Jan Ortega MD;  Location: [facility] ENDOSCOPY;  Service: Gastroenterology;  Laterality: N/A;  normal    REPLACEMENT TOTAL KNEE      Right    TONSILLECTOMY         Family History: family history is not on file. Otherwise pertinent FHx was reviewed and not pertinent to current issue.    Social History:  reports that she has never smoked. She has never used smokeless tobacco. She reports that she does not currently use alcohol. She reports that she does not currently use drugs.    Home Medications:  Prior to Admission Medications       Prescriptions Last Dose Informant Patient Reported? Taking?    latanoprost (XALATAN) 0.005 % ophthalmic solution 11/6/2024  Yes Yes    Administer 1 drop to both eyes Every Night.    metoprolol tartrate (LOPRESSOR) 25 MG tablet 11/7/2024  Yes Yes    Take 1 tablet by mouth 2 (Two) Times a Day With Meals.    mirtazapine (REMERON) 15 MG tablet 11/6/2024  Yes Yes    Take 1 tablet by mouth Every Night.    pentoxifylline (TRENtal) 400 MG CR tablet 11/7/2024  Yes Yes    Take 1 tablet by mouth 2 (Two) Times a Day.    pramipexole (MIRAPEX) 0.5 MG tablet 11/6/2024   Yes Yes    Take 1 tablet by mouth every night at bedtime.    sulfamethoxazole-trimethoprim (BACTRIM DS,SEPTRA DS) 800-160 MG per tablet 11/7/2024  Yes Yes    Take 1 tablet by mouth 2 (Two) Times a Day.    Denosumab (PROLIA SC) 9/9/2024  Yes No    Inject  under the skin into the appropriate area as directed Every 6 (Six) Hours.    dicyclomine (BENTYL) 20 MG tablet   Yes No    Take 1 tablet by mouth As Needed for Abdominal Cramping.    traMADol (ULTRAM) 50 MG tablet   Yes No    Take 1 tablet by mouth Every 6 (Six) Hours As Needed.              Allergies:  Allergies   Allergen Reactions    Morphine Hives and Rash    Ampicillin Hives       Objective      Vitals:   Temp:  [98.1 °F (36.7 °C)-99.5 °F (37.5 °C)] 99.5 °F (37.5 °C)  Heart Rate:  [72-99] 93  Resp:  [20] 20  BP: (116-181)/(52-82) 117/55  Body mass index is 25 kg/m².  Physical Exam  Constitutional:       Appearance: Normal appearance.   HENT:      Head: Normocephalic.   Cardiovascular:      Rate and Rhythm: Normal rate and regular rhythm.      Pulses: Normal pulses.      Heart sounds: Normal heart sounds. No murmur heard.  Pulmonary:      Effort: Pulmonary effort is normal. No respiratory distress.      Breath sounds: Normal breath sounds. No wheezing or rales.   Abdominal:      General: Bowel sounds are normal. There is no distension.      Tenderness: There is no abdominal tenderness.   Musculoskeletal:         General: Swelling and tenderness (Chest wall mass) present. No deformity or signs of injury.      Cervical back: Normal range of motion.   Neurological:      General: No focal deficit present.      Mental Status: She is alert and oriented to person, place, and time. Mental status is at baseline.      Cranial Nerves: No cranial nerve deficit.      Sensory: No sensory deficit.      Motor: No weakness.      Coordination: Coordination normal.       Diagnostic Data:  Lab Results (last 24 hours)       Procedure Component Value Units Date/Time    High  Sensitivity Troponin T 2Hr [[id number]]  (Abnormal) Collected: 11/07/24 1743    Specimen: Blood Updated: 11/07/24 1807     HS Troponin T 54 ng/L      Troponin T Delta 0 ng/L     Narrative:      High Sensitive Troponin T Reference Range:  <14.0 ng/L- Negative Female for AMI  <22.0 ng/L- Negative Male for AMI  >=14 - Abnormal Female indicating possible myocardial injury.  >=22 - Abnormal Male indicating possible myocardial injury.   Clinicians would have to utilize clinical acumen, EKG, Troponin, and serial changes to determine if it is an Acute Myocardial Infarction or myocardial injury due to an underlying chronic condition.         High Sensitivity Troponin T [[id number]]  (Abnormal) Collected: 11/07/24 1539    Specimen: Blood Updated: 11/07/24 1607     HS Troponin T 54 ng/L     Narrative:      High Sensitive Troponin T Reference Range:  <14.0 ng/L- Negative Female for AMI  <22.0 ng/L- Negative Male for AMI  >=14 - Abnormal Female indicating possible myocardial injury.  >=22 - Abnormal Male indicating possible myocardial injury.   Clinicians would have to utilize clinical acumen, EKG, Troponin, and serial changes to determine if it is an Acute Myocardial Infarction or myocardial injury due to an underlying chronic condition.         Urinalysis, Microscopic Only - Straight Cath [[id number]] Collected: 11/07/24 1452    Specimen: Urine from Straight Cath Updated: 11/07/24 1559     RBC, UA None Seen /HPF      WBC, UA 0-2 /HPF      Comment: Urine culture not indicated.        Bacteria, UA None Seen /HPF      Squamous Epithelial Cells, UA 0-2 /HPF      Hyaline Casts, UA None Seen /LPF      Methodology Manual Light Microscopy    Comprehensive Metabolic Panel [[id number]]  (Abnormal) Collected: 11/07/24 1451    Specimen: Blood Updated: 11/07/24 1523     Glucose 96 mg/dL      BUN 19 mg/dL      Creatinine 1.28 mg/dL      Sodium 133 mmol/L      Potassium 4.7 mmol/L      Chloride 97 mmol/L      CO2 21.8 mmol/L      Calcium 9.7  mg/dL      Total Protein 6.9 g/dL      Albumin 4.5 g/dL      ALT (SGPT) 18 U/L      AST (SGOT) 28 U/L      Alkaline Phosphatase 50 U/L      Total Bilirubin 0.3 mg/dL      Globulin 2.4 gm/dL      A/G Ratio 1.9 g/dL      BUN/Creatinine Ratio 14.8     Anion Gap 14.2 mmol/L      eGFR 40.1 mL/min/1.73     Narrative:      GFR Normal >60  Chronic Kidney Disease <60  Kidney Failure <15    The GFR formula is only valid for adults with stable renal function between ages 18 and 70.    BNP [[id number]]  (Normal) Collected: 11/07/24 1451    Specimen: Blood Updated: 11/07/24 1523     proBNP 946.0 pg/mL     Narrative:      This assay is used as an aid in the diagnosis of individuals suspected of having heart failure. It can be used as an aid in the diagnosis of acute decompensated heart failure (ADHF) in patients presenting with signs and symptoms of ADHF to the emergency department (ED). In addition, NT-proBNP of <300 pg/mL indicates ADHF is not likely.    Age Range Result Interpretation  NT-proBNP Concentration (pg/mL:      <50             Positive            >450                   Gray                 300-450                    Negative             <300    50-75           Positive            >900                  Gray                300-900                  Negative            <300      >75             Positive            >1800                  Gray                300-1800                  Negative            <300    Urinalysis With Culture If Indicated - Straight Cath [[id number]]  (Abnormal) Collected: 11/07/24 1452    Specimen: Urine from Straight Cath Updated: 11/07/24 1514     Color, UA Yellow     Appearance, UA Clear     pH, UA 6.0     Specific Gravity, UA 1.010     Glucose, UA Negative     Ketones, UA Negative     Bilirubin, UA Negative     Blood, UA Trace     Protein, UA Negative     Leuk Esterase, UA Trace     Nitrite, UA Negative     Urobilinogen, UA 0.2 E.U./dL    Narrative:      In absence of clinical symptoms, the  presence of pyuria, bacteria, and/or nitrites on the urinalysis result does not correlate with infection.    CBC & Differential [[id number]]  (Abnormal) Collected: 11/07/24 1451    Specimen: Blood Updated: 11/07/24 1501    Narrative:      The following orders were created for panel order CBC & Differential.  Procedure                               Abnormality         Status                     ---------                               -----------         ------                     CBC Auto Differential[[id number]]        Abnormal            Final result                 Please view results for these tests on the individual orders.    CBC Auto Differential [[id number]]  (Abnormal) Collected: 11/07/24 1451    Specimen: Blood Updated: 11/07/24 1501     WBC 5.88 10*3/mm3      RBC 3.97 10*6/mm3      Hemoglobin 11.0 g/dL      Hematocrit 34.5 %      MCV 86.9 fL      MCH 27.7 pg      MCHC 31.9 g/dL      RDW 13.5 %      RDW-SD 42.9 fl      MPV 9.1 fL      Platelets 229 10*3/mm3      Neutrophil % 77.9 %      Lymphocyte % 12.6 %      Monocyte % 7.0 %      Eosinophil % 1.9 %      Basophil % 0.3 %      Immature Grans % 0.3 %      Neutrophils, Absolute 4.58 10*3/mm3      Lymphocytes, Absolute 0.74 10*3/mm3      Monocytes, Absolute 0.41 10*3/mm3      Eosinophils, Absolute 0.11 10*3/mm3      Basophils, Absolute 0.02 10*3/mm3      Immature Grans, Absolute 0.02 10*3/mm3      nRBC 0.0 /100 WBC     [facility] Draw [[id number]] Collected: 11/07/24 1450    Specimen: Blood Updated: 11/07/24 1500    Narrative:      The following orders were created for panel order [facility] Draw.  Procedure                               Abnormality         Status                     ---------                               -----------         ------                     Green Top (Gel)[[id number]]                                  Final result               Lavender Top[[id number]]                                     Final result               Gold Top - SST[[id number]]                                    Final result               Light Blue Top[[id number]]                                   Final result                 Please view results for these tests on the individual orders.    Green Top (Gel) [[id number]] Collected: 11/07/24 1450    Specimen: Blood Updated: 11/07/24 1500     Extra Tube Hold for add-ons.     Comment: Auto resulted.       Gold Top - SST [[id number]] Collected: 11/07/24 1451    Specimen: Blood Updated: 11/07/24 1500     Extra Tube Hold for add-ons.     Comment: Auto resulted.       Lavender Top [[id number]] Collected: 11/07/24 1451    Specimen: Blood Updated: 11/07/24 1500     Extra Tube hold for add-on     Comment: Auto resulted       Light Blue Top [[id number]] Collected: 11/07/24 1451    Specimen: Blood Updated: 11/07/24 1500     Extra Tube Hold for add-ons.     Comment: Auto resulted                Imaging Results (Last 24 Hours)       Procedure Component Value Units Date/Time    CT Chest With Contrast Diagnostic [[id number]] Collected: 11/07/24 1719     Updated: 11/07/24 1740    Narrative:      CT CHEST W CONTRAST DIAGNOSTIC    Date of Exam: 11/7/2024 4:56 PM EST    Indication: sternal mass.    Comparison: CT chest 2/5/2009    Technique: Axial CT images were obtained of the chest after the uneventful intravenous administration of iodinated contrast.  Sagittal and coronal reconstructions were performed.  Automated exposure control and iterative reconstruction methods were used.      Findings:  Thyroid grossly unremarkable. No suspicious supraclavicular adenopathy. Aortic atherosclerotic disease without aneurysm. Severe coronary atherosclerotic disease. Mitral annular calcifications. Heart size within normal limits. No pericardial effusion.   Normal caliber main pulmonary artery.    No suspicious mediastinal or hilar adenopathy. No convincing internal mammary chain adenopathy. There is fluid in the midesophagus. Trachea patent. Negative for infectious consolidation,  edema, effusion or pneumothorax. Fairly symmetric biapical   pleural-parenchymal scarring. Few small nodular densities in the right apex without significant change from 2009 favored chronic/benign. No overtly suspicious pulmonary nodule. Benign densely calcified left lower lobe pulmonary granuloma.    There is a soft tissue mass in the midline/right paramidline lower chest wall measuring up to 3.1 x 4.2 x 10 cm AP, transverse and craniocaudal dimension example image 80 series 2. Mass extends through the chest wall into the right cardiophrenic space   image 89 series 2 probably involving portions of the anterior fifth, sixth and seventh costal cartilage and intercostal spaces. No overtly suspicious axillary adenopathy or other discrete chest wall mass.    Degenerative elated changes throughout the spine. Previous kyphoplasty at T11 and T12 with chronic compression fractures. There is up to 6 mm bony retropulsion at T12. Minimal retrolisthesis L1 on L2, L2 on L3 and anterolisthesis L3 on L4 likely   degenerative.    Gallstones without findings of acute cholecystitis. No suspicious liver lesion. Punctate nonobstructing right upper pole renal calculus. No dilation of biliary tree. Ill-defined hypodensity measuring 1.4 cm in the medial aspect of pancreatic head image   102 series 2. Upstream pancreatic duct is slightly prominent measuring 3 mm.      Impression:      Impression:  1. Locally invasive right chest wall mass involving costal cartilage and intercostal spaces centered at anterior fifth through seventh ribs. Findings concerning for malignancy, possibly of breast malignancy although differential may include sarcoma,   lymphoma or metastatic disease from other etiology. Correlate with tissue sampling.  2. No convincing adenopathy or suspicious pulmonary nodule in the chest.  3. Ill-defined hypodense lesion in the pancreatic head. Recommend abdominal MRI without and with IV contrast to exclude mass.  4. No acute  airspace process. Chronic biapical pleural-parenchymal scarring.  5. Aortic and coronary atherosclerotic disease.  6. Cholelithiasis without findings of acute cholecystitis.  7. Other chronic/ancillary findings as above.        Electronically Signed: Carlos Flores MD    11/7/2024 5:38 PM EST    Workstation ID: [id number]    XR Chest 1 View [[id number]] Collected: 11/07/24 1539     Updated: 11/07/24 1542    Narrative:      XR CHEST 1 VW    Date of Exam: 11/7/2024 3:33 PM EST    Indication: Chest pain    Comparison: 1/5/2024.    Findings:  The heart size is normal. The pulmonary vascular markings are normal. There is bilateral apical pleural thickening. There is a dense nodule in the left lung base consistent with a granuloma. The lungs and pleural spaces are otherwise clear. There are   chronic age-related changes involving the bony thorax and thoracic aorta.      Impression:      Impression:  No active disease.      Electronically Signed: Cedric Henry MD    11/7/2024 3:40 PM EST    Workstation ID: [id number]              Assessment & Plan        This is a 89 y.o. female with:    Active and Resolved Problems  Active Hospital Problems    Diagnosis  POA    **Shortness of breath [R06.02]  Yes    Leg swelling [M79.89]  Yes    Chronic midline low back pain without sciatica [M54.50, G89.29]  Yes      Resolved Hospital Problems   No resolved problems to display.       Shortness of breath and bilateral lower extremity edema  Incidental finding of invasive right chest wall mass involving ribs  Incidental finding of hypodense lesion in the pancreatic head  History of benign left breast status post lumpectomy (in the 70s)  -BNP within normal limits, troponins peaked at 54.  Denies history of hypertension or CHF  -CT chest with contrast reviewed recommends abdominal MRI with and without contrast for evaluation of pancreatic lesion  -Received 1 dose of Lasix 40 mg IV and Robaxin , start on Flexeril  -Dilaudid 0.5 mg every 2 hours for  pain  -Lidocaine patch for acute back pain  -Heme-onc consult  -Falls precaution  -PT OT eval  -Echocardiogram  -Bilateral lower extremity Dopplers      ANIBAL versus ANIBAL on CKD  -Status post Lasix 40 mg x 1 in the ED for bilateral lower extremity edema  -Strict I's and O's  -BMP in the a.m.  -Replete electrolytes as needed    History of chronic back pain  -Hold home dose of tramadol  -Start on Flexeril, Dilaudid and lidocaine patch    History of osteoporosis  -On denosumab every 6 months    VTE Prophylaxis:  Pharmacologic VTE prophylaxis orders are present.        The patient desires to be as follows:    CODE STATUS:    Code Status (Patient has no pulse and is not breathing): CPR (Attempt to Resuscitate)  Medical Interventions (Patient has pulse or is breathing): Full Support        Billie Wei, who can be contacted at [phone], is the designated person to make medical decisions on the patient's behalf if She is incapable of doing so. This was clarified with patient and/or next of kin on 11/7/2024 during the course of this H&P.    Admission Status:  I believe this patient meets observation status.    Expected Length of Stay: Less than 2 nights    PDMP and Medication Dispenses via Sidebar reviewed and consistent with patient reported medications.    I discussed the patient's findings and my recommendations with patient and family.      Signature:     This document has been electronically signed by Oleg Noyola MD on November 7, 2024 21:02 [facility] Hospitalist Team

## 2024-11-08 NOTE — PLAN OF CARE
Problem: Adult Inpatient Plan of Care  Goal: Absence of Hospital-Acquired Illness or Injury  Intervention: Identify and Manage Fall Risk  Recent Flowsheet Documentation  Taken 11/8/2024 0400 by Ramses De RN  Safety Promotion/Fall Prevention:   assistive device/personal items within reach   clutter free environment maintained   fall prevention program maintained   nonskid shoes/slippers when out of bed   room organization consistent   safety round/check completed  Taken 11/8/2024 0200 by Ramses De RN  Safety Promotion/Fall Prevention:   assistive device/personal items within reach   clutter free environment maintained   fall prevention program maintained   nonskid shoes/slippers when out of bed   room organization consistent   safety round/check completed  Intervention: Prevent Skin Injury  Recent Flowsheet Documentation  Taken 11/8/2024 0400 by Ramses De RN  Body Position: weight shifting  Taken 11/8/2024 0200 by Ramses De RN  Body Position:   turned   right  Intervention: Prevent Infection  Recent Flowsheet Documentation  Taken 11/8/2024 0400 by Ramses De RN  Infection Prevention:   environmental surveillance performed   equipment surfaces disinfected   hand hygiene promoted   personal protective equipment utilized   rest/sleep promoted   single patient room provided   visitors restricted/screened  Taken 11/8/2024 0200 by Ramses De RN  Infection Prevention:   environmental surveillance performed   equipment surfaces disinfected   hand hygiene promoted   personal protective equipment utilized   rest/sleep promoted   single patient room provided   visitors restricted/screened  Goal: Optimal Comfort and Wellbeing  Intervention: Monitor Pain and Promote Comfort  Recent Flowsheet Documentation  Taken 11/8/2024 0400 by Ramses De RN  Pain Management Interventions:   quiet environment facilitated   no interventions per patient request   Goal Outcome Evaluation:      Patient is admitted to the unit accompanied by daughter who is at bedside.

## 2024-11-08 NOTE — PROGRESS NOTES
[facility] MEDICINE SERVICE  DAILY PROGRESS NOTE    NAME: Juany Dalton  : 1934  MRN: [id number]      LOS: 0 days     PROVIDER OF SERVICE: Henry Dobson MD    Chief Complaint: Shortness of breath    Subjective:     Interval History:  History taken from: patient    No acute overnight events, patient reports having worsening shortness of breath and worsening of edema in the bilateral extremities for past few days, which prompted her to come to the hospital, denies chest pain, fever or chills.  Daughter is present at bedside confirmed the symptoms, stated she will get more information from her other daughter if the patient had a previous workup for this chest mass.    Review of Systems:   Review of Systems    Objective:     Vital Signs  Temp:  [98 °F (36.7 °C)-99.5 °F (37.5 °C)] 98.5 °F (36.9 °C)  Heart Rate:  [72-99] 88  Resp:  [18-20] 19  BP: ()/(41-82) 142/63   Body mass index is 25 kg/m².    Physical Exam  General Appearance:  Awake alert   Head:  Atraumatic normocephalic   Eyes:        No scleral icterus   Neck: Normal range of motion   Pulm: Decreased breath sounds   Cardio: Heart rate normal, regular rhythm   Extremities: Edema present on the bilateral extremities slight redness noted   Abdomen: Soft nontender       Musculoskeletal: Moves all extremities   Neuro: Awake, alert, conversational, normal speech               Scheduled Meds   cyclobenzaprine, 10 mg, Oral, TID  enoxaparin, 30 mg, Subcutaneous, Q24H  latanoprost, 1 drop, Both Eyes, Nightly  Lidocaine, 1 patch, Transdermal, Nightly  metoprolol tartrate, 25 mg, Oral, BID With Meals  mirtazapine, 15 mg, Oral, Nightly  pentoxifylline, 400 mg, Oral, BID  pramipexole, 0.5 mg, Oral, Nightly  senna-docusate sodium, 2 tablet, Oral, BID  sodium chloride, 10 mL, Intravenous, Q12H       PRN Meds     senna-docusate sodium **AND** polyethylene glycol **AND** bisacodyl **AND** bisacodyl    Calcium Replacement - Follow Nurse / BPA Driven  Protocol    dicyclomine    HYDROmorphone    HYDROmorphone **AND** naloxone    Magnesium Standard Dose Replacement - Follow Nurse / BPA Driven Protocol    nitroglycerin    Phosphorus Replacement - Follow Nurse / BPA Driven Protocol    Potassium Replacement - Follow Nurse / BPA Driven Protocol    sodium chloride    sodium chloride    sodium chloride    sodium chloride   Infusions         Diagnostic Data    Results from last 7 days   Lab Units 11/08/24  0420   WBC 10*3/mm3 6.04   HEMOGLOBIN g/dL 9.9*   HEMATOCRIT % 30.7*   PLATELETS 10*3/mm3 191   GLUCOSE mg/dL 90   CREATININE mg/dL 1.46*   BUN mg/dL 20   SODIUM mmol/L 133*   POTASSIUM mmol/L 4.2   AST (SGOT) U/L 30   ALT (SGPT) U/L 15   ALK PHOS U/L 43   BILIRUBIN mg/dL 0.4   ANION GAP mmol/L 11.6       CT Chest With Contrast Diagnostic    Result Date: 11/7/2024  Impression: 1. Locally invasive right chest wall mass involving costal cartilage and intercostal spaces centered at anterior fifth through seventh ribs. Findings concerning for malignancy, possibly of breast malignancy although differential may include sarcoma, lymphoma or metastatic disease from other etiology. Correlate with tissue sampling. 2. No convincing adenopathy or suspicious pulmonary nodule in the chest. 3. Ill-defined hypodense lesion in the pancreatic head. Recommend abdominal MRI without and with IV contrast to exclude mass. 4. No acute airspace process. Chronic biapical pleural-parenchymal scarring. 5. Aortic and coronary atherosclerotic disease. 6. Cholelithiasis without findings of acute cholecystitis. 7. Other chronic/ancillary findings as above. Electronically Signed: Carlos Flores MD  11/7/2024 5:38 PM EST  Workstation ID: [id number]    XR Chest 1 View    Result Date: 11/7/2024  Impression: No active disease. Electronically Signed: Cedric Henry MD  11/7/2024 3:40 PM EST  Workstation ID: [id number]       I reviewed the patient's new clinical results.    Assessment/Plan:   Juany Dalton is a 89  y.o. female with a CMH of chronic back pain, osteoporosis, arthritis, history of left breast lumpectomy (in the 70s) that was benign, lower extremity lymphedema, growing chest mass x 3 months who presented to [facility] on 11/7/2024 with shortness of breath and bilateral lower extremity edema progressively gotten worse in the past week.  His night sweats, weight loss, cough, chest pain, nausea vomiting or diarrhea. Of note, patient noticed a mid chest mass in August that her PCP suspected to be a bony protrusion.  She saw her PCP in the past week for a UTI and her PCP ordered a chest CT due to increased growth of the mass.    Assessments:  #Dyspnea with bilateral lower extremities edema: Rule out CHF  #Right chest wall mass involving 5th through 7th ribs, Measuring 3.1 x 4.2 x 10 cm   #Status post biopsy in 2021 left breast, by Dr. Gallegos, pathology results were benign   #hypodense lesion in the pancreatic head, measuring 1.4 cm, MRI pending for further eval  # ANIBAL, previous baseline creatinine was around 1.05s  #Cholelithiasis    Plan:  -Oncology consulted, MRI pending pancreatic head lesion, will consult GI pending MRI results  -She had a biopsy in 2021 pathology showing benign results, discussed with patient daughter, she could remember  -Will start on Lasix 20 daily given her soft BP, monitor strict ins and outs, echo is pending will follow  -Continue home meds will follow  -VTE prophylaxis  -AM labs will follow    VTE Prophylaxis:  Pharmacologic VTE prophylaxis orders are present.         Code status is   Code Status and Medical Interventions: CPR (Attempt to Resuscitate); Full Support   Ordered at: 11/07/24 1957     Code Status (Patient has no pulse and is not breathing):    CPR (Attempt to Resuscitate)     Medical Interventions (Patient has pulse or is breathing):    Full Support       Plan for disposition:Oncology work up to rule out malignancy, Possible GI consult, disposition will decide  pending work up    Time: 30 minutes    Part of this note may be an electronic transcription/translation of spoken language to printed text using the Dragon Dictation System.    Signature: Electronically signed by Henry Dobson MD, 11/08/24, 11:12 EST.  [facility]ist Team

## 2024-11-08 NOTE — CONSULTS
GI CONSULT  NOTE:    Referring Provider:  Dr. Dobson     Chief complaint: Pancreatic mass    Subjective .     History of present illness: Juany Dalton is a 89 y.o. female with history of chronic back pain, lower extremity lymphedema, and appendectomy who presents with complaints of shortness of breath and lower extremity edema.  She has been diagnosed with fluid overload and is receiving diuresis.  GI has been asked to consult due to pancreatic mass on CT.  The patient denies any abdominal pain.  No nausea/vomiting, red, or dysphagia.  Reports regular bowel movements without bright red blood per rectum or melena.  She does take stool softeners on a regular basis.  No recent fever or unintentional weight loss.  She denies any previous pancreatitis or pancreas issues.  No family history of pancreas issues.  Of note, the patient does report a chest wall mass that she noticed approximately 3 months ago.  She did see her primary care provider who suspected that this was a bony prominence.  However, chest wall mass has been progressively enlarging.      Endo History:  8/2022 EGD (Dr. Ortega) -2 clean-based ulcers, gastritis with biopsy negative for H. Pylori  Patient reports colonoscopy more than 10 years ago.    Past Medical History:  Past Medical History:   Diagnosis Date    Arthritis     Back pain     Breast lump     Hypertension     Leg cramps     Osteoporosis     Tremor        Past Surgical History:  Past Surgical History:   Procedure Laterality Date    APPENDECTOMY      BREAST LUMPECTOMY      Left    ENDOSCOPY N/A 8/30/2022    Procedure: ESOPHAGOGASTRODUODENOSCOPY;  Surgeon: Jan Ortega MD;  Location: [facility] ENDOSCOPY;  Service: Gastroenterology;  Laterality: N/A;  normal    REPLACEMENT TOTAL KNEE      Right    TONSILLECTOMY         Social History:  Social History     Tobacco Use    Smoking status: Never    Smokeless tobacco: Never   Vaping Use    Vaping status: Never Used   Substance Use Topics    Alcohol  use: Not Currently    Drug use: Not Currently       Family History:  History reviewed. No pertinent family history.    Medications:  Medications Prior to Admission   Medication Sig Dispense Refill Last Dose/Taking    latanoprost (XALATAN) 0.005 % ophthalmic solution Administer 1 drop to both eyes Every Night.   11/6/2024    metoprolol tartrate (LOPRESSOR) 25 MG tablet Take 1 tablet by mouth 2 (Two) Times a Day With Meals.   11/7/2024    mirtazapine (REMERON) 15 MG tablet Take 1 tablet by mouth Every Night.   11/6/2024    pentoxifylline (TRENtal) 400 MG CR tablet Take 1 tablet by mouth 2 (Two) Times a Day.   11/7/2024    pramipexole (MIRAPEX) 0.5 MG tablet Take 1 tablet by mouth every night at bedtime.   11/6/2024    sulfamethoxazole-trimethoprim (BACTRIM DS,SEPTRA DS) 800-160 MG per tablet Take 1 tablet by mouth 2 (Two) Times a Day.   11/7/2024    Denosumab (PROLIA SC) Inject  under the skin into the appropriate area as directed Every 6 (Six) Hours.   9/9/2024    dicyclomine (BENTYL) 20 MG tablet Take 1 tablet by mouth As Needed for Abdominal Cramping.       traMADol (ULTRAM) 50 MG tablet Take 1 tablet by mouth Every 6 (Six) Hours As Needed.          Scheduled Meds:cyclobenzaprine, 10 mg, Oral, TID  enoxaparin, 30 mg, Subcutaneous, Q24H  furosemide, 40 mg, Oral, Daily  latanoprost, 1 drop, Both Eyes, Nightly  Lidocaine, 1 patch, Transdermal, Nightly  metoprolol tartrate, 25 mg, Oral, BID With Meals  mirtazapine, 15 mg, Oral, Nightly  pentoxifylline, 400 mg, Oral, BID  pramipexole, 0.5 mg, Oral, Nightly  senna-docusate sodium, 2 tablet, Oral, BID  sodium chloride, 10 mL, Intravenous, Q12H      Continuous Infusions:   PRN Meds:.  senna-docusate sodium **AND** polyethylene glycol **AND** bisacodyl **AND** bisacodyl    Calcium Replacement - Follow Nurse / BPA Driven Protocol    dicyclomine    HYDROmorphone    HYDROmorphone **AND** naloxone    Magnesium Standard Dose Replacement - Follow Nurse / BPA Driven Protocol     "nitroglycerin    Phosphorus Replacement - Follow Nurse / BPA Driven Protocol    Potassium Replacement - Follow Nurse / BPA Driven Protocol    sodium chloride    sodium chloride    sodium chloride    sodium chloride    ALLERGIES:  Morphine and Ampicillin    ROS:  The following systems were reviewed and negative;   Constitution:  No fevers, chills, no unintentional weight loss  Skin: no rash, no jaundice  Eyes:  No blurry vision, no eye pain  HENT:  No change in hearing or smell  Resp:  No dyspnea or cough  CV:  No chest pain or palpitations  :  No dysuria, hematuria  Musculoskeletal:  No leg cramps or arthralgias  Neuro:  No tremor, no numbness  Psych:  No depression or confusion    Objective     Vital Signs:   Vitals:    11/08/24 0744 11/08/24 0753 11/08/24 0837 11/08/24 1114   BP: 142/63  142/63 107/44   BP Location: Left arm   Right arm   Patient Position: Lying   Sitting   Pulse: 84 88  67   Resp: 19   20   Temp: 98.5 °F (36.9 °C)      TempSrc: Oral      SpO2: 96%   97%   Weight:   58.1 kg (128 lb)    Height:   152.4 cm (60\")        Physical Exam:       General Appearance:    Awake and alert, in no acute distress   Head:    Normocephalic, without obvious abnormality, atraumatic   Throat:   No oral lesions, no thrush, oral mucosa moist   Lungs:     Respirations regular, even and unlabored   Chest Wall:    Chest wall mass present   Abdomen:     Soft, non-tender, no rebound or guarding, non-distended   Rectal:     Deferred   Extremities:   Moves all extremities, no edema, no cyanosis   Pulses:   Pulses palpable and equal bilaterally   Skin:   No rash, no jaundice, normal palpation   Lymph nodes:   No cervical, supraclavicular or submandibular palpable adenopathy   Neurologic:   Cranial nerves 2 - 12 grossly intact, no asterixis       Results Review:   I reviewed the patient's labs and imaging.  CBC    Results from last 7 days   Lab Units 11/08/24  0420 11/07/24  1451   WBC 10*3/mm3 6.04 5.88   HEMOGLOBIN g/dL 9.9* " "11.0*   PLATELETS 10*3/mm3 191 229     CMP   Results from last 7 days   Lab Units 11/08/24  0420 11/07/24  1743 11/07/24  1451   SODIUM mmol/L 133*  --  133*   POTASSIUM mmol/L 4.2  --  4.7   CHLORIDE mmol/L 99  --  97*   CO2 mmol/L 22.4  --  21.8*   BUN mg/dL 20  --  19   CREATININE mg/dL 1.46*  --  1.28*   GLUCOSE mg/dL 90  --  96   ALBUMIN g/dL 3.8  --  4.5   BILIRUBIN mg/dL 0.4  --  0.3   ALK PHOS U/L 43  --  50   AST (SGOT) U/L 30  --  28   ALT (SGPT) U/L 15  --  18   MAGNESIUM mg/dL 2.2 1.9  --    PHOSPHORUS mg/dL 4.6*  --   --      Cr Clearance Estimated Creatinine Clearance: 20.8 mL/min (A) (by C-G formula based on SCr of 1.46 mg/dL (H)).  Coag     HbA1C No results found for: \"HGBA1C\"  Blood Glucose No results found for: \"POCGLU\"  Infection     UA    Results from last 7 days   Lab Units 11/07/24  1452   NITRITE UA  Negative   WBC UA /HPF 0-2   BACTERIA UA /HPF None Seen   SQUAM EPITHEL UA /HPF 0-2     Imaging Results (Last 72 Hours)       Procedure Component Value Units Date/Time    CT Chest With Contrast Diagnostic [[id number]] Collected: 11/07/24 1719     Updated: 11/07/24 1740    Narrative:      CT CHEST W CONTRAST DIAGNOSTIC    Date of Exam: 11/7/2024 4:56 PM EST    Indication: sternal mass.    Comparison: CT chest 2/5/2009    Technique: Axial CT images were obtained of the chest after the uneventful intravenous administration of iodinated contrast.  Sagittal and coronal reconstructions were performed.  Automated exposure control and iterative reconstruction methods were used.      Findings:  Thyroid grossly unremarkable. No suspicious supraclavicular adenopathy. Aortic atherosclerotic disease without aneurysm. Severe coronary atherosclerotic disease. Mitral annular calcifications. Heart size within normal limits. No pericardial effusion.   Normal caliber main pulmonary artery.    No suspicious mediastinal or hilar adenopathy. No convincing internal mammary chain adenopathy. There is fluid in the " midesophagus. Trachea patent. Negative for infectious consolidation, edema, effusion or pneumothorax. Fairly symmetric biapical   pleural-parenchymal scarring. Few small nodular densities in the right apex without significant change from 2009 favored chronic/benign. No overtly suspicious pulmonary nodule. Benign densely calcified left lower lobe pulmonary granuloma.    There is a soft tissue mass in the midline/right paramidline lower chest wall measuring up to 3.1 x 4.2 x 10 cm AP, transverse and craniocaudal dimension example image 80 series 2. Mass extends through the chest wall into the right cardiophrenic space   image 89 series 2 probably involving portions of the anterior fifth, sixth and seventh costal cartilage and intercostal spaces. No overtly suspicious axillary adenopathy or other discrete chest wall mass.    Degenerative elated changes throughout the spine. Previous kyphoplasty at T11 and T12 with chronic compression fractures. There is up to 6 mm bony retropulsion at T12. Minimal retrolisthesis L1 on L2, L2 on L3 and anterolisthesis L3 on L4 likely   degenerative.    Gallstones without findings of acute cholecystitis. No suspicious liver lesion. Punctate nonobstructing right upper pole renal calculus. No dilation of biliary tree. Ill-defined hypodensity measuring 1.4 cm in the medial aspect of pancreatic head image   102 series 2. Upstream pancreatic duct is slightly prominent measuring 3 mm.      Impression:      Impression:  1. Locally invasive right chest wall mass involving costal cartilage and intercostal spaces centered at anterior fifth through seventh ribs. Findings concerning for malignancy, possibly of breast malignancy although differential may include sarcoma,   lymphoma or metastatic disease from other etiology. Correlate with tissue sampling.  2. No convincing adenopathy or suspicious pulmonary nodule in the chest.  3. Ill-defined hypodense lesion in the pancreatic head. Recommend  abdominal MRI without and with IV contrast to exclude mass.  4. No acute airspace process. Chronic biapical pleural-parenchymal scarring.  5. Aortic and coronary atherosclerotic disease.  6. Cholelithiasis without findings of acute cholecystitis.  7. Other chronic/ancillary findings as above.        Electronically Signed: Carlos Flores MD    11/7/2024 5:38 PM EST    Workstation ID: [id number]    XR Chest 1 View [[id number]] Collected: 11/07/24 1539     Updated: 11/07/24 1542    Narrative:      XR CHEST 1 VW    Date of Exam: 11/7/2024 3:33 PM EST    Indication: Chest pain    Comparison: 1/5/2024.    Findings:  The heart size is normal. The pulmonary vascular markings are normal. There is bilateral apical pleural thickening. There is a dense nodule in the left lung base consistent with a granuloma. The lungs and pleural spaces are otherwise clear. There are   chronic age-related changes involving the bony thorax and thoracic aorta.      Impression:      Impression:  No active disease.      Electronically Signed: Cedric Henry MD    11/7/2024 3:40 PM EST    Workstation ID: [id number]            ASSESSMENT:  -Abnormal CT showing right chest wall mass and hypodense lesion in the pancreatic head  -Normocytic anemia  -ANIBAL  -Electrolyte abnormalities  -Chest wall mass  -Cholelithiasis  -Chronic back pain  -History of appendectomy    PLAN:  Patient is an 89-year-old female with history of chronic back pain and appendectomy who presented on 11/7 with complaints of shortness of breath and lower extremity edema.  Being treated for fluid overload by primary care team.  GI has been asked to consult due to abnormal CT showing pancreatic head lesion.    CT chest W showing locally invasive right chest wall mass involving the costal cartilage and intercostal spaces centered at the anterior 5th through 7th ribs, hypodense lesion in the pancreatic head, no convincing adenopathy, cholelithiasis.  We will plan MRI pancreatic protocol for  further evaluation.  Check CA 19-9.  May need to consider EUS pending results.  Hemoglobin 9.9.  Continue to monitor H/H and transfuse as needed.  Oncology also consulted.  Await input.  Correction of electrolyte abnormalities per primary care team.  Analgesics for chronic back pain as needed.  Supportive care.      I discussed the patients findings and my recommendations with the patient.  I will discuss the case with Dr. Manuel and change plan accordingly.    We appreciate the referral.    Electronically signed by KARI Beltran, 11/08/24, 1:13 PM EST.

## 2024-11-08 NOTE — PLAN OF CARE
Goal Outcome Evaluation:              Outcome Evaluation: Pt is an 88 y/o female who presented from home with SOA and BLE edema. Incidental finding of pancreatic lesion. Also with chest wall mass  (involving ribs 5 through 7 on R side) which has increased in size in the last 3 months. Pt has recurrent UTIs and is being treated for one currently. Pt lives with her dtr and JUAQUIN in a [facility] with 1 [address]. She is usually alone during the day M-F while her dtr works. She has a life alert and is able to walk short distances unassisted with her rollator.  This date pt demos generalized weakness and posterior lean with standing.  She requires assist for toileting tasks due to balance impairments. She appears to be far below [facility] and is not safe to return home.  Recommend SNF at discharge.    Anticipated Discharge Disposition (OT): skilled nursing facility

## 2024-11-08 NOTE — THERAPY EVALUATION
Patient Name: Juany Dalton  : 1934    MRN: [id number]                              Today's Date: 2024       Admit Date: 2024    Visit Dx:     ICD-10-CM ICD-9-CM   1. Elevated troponin  R79.89 790.6   2. Bilateral leg edema  R60.0 782.3   3. Shortness of breath  R06.02 786.05   4. Chest wall mass  R22.2 786.6     Patient Active Problem List   Diagnosis    Major depressive disorder, single episode, mild    Shortness of breath    Leg swelling    Chronic midline low back pain without sciatica     Past Medical History:   Diagnosis Date    Arthritis     Back pain     Breast lump     Hypertension     Leg cramps     Osteoporosis     Tremor      Past Surgical History:   Procedure Laterality Date    APPENDECTOMY      BREAST LUMPECTOMY      Left    ENDOSCOPY N/A 2022    Procedure: ESOPHAGOGASTRODUODENOSCOPY;  Surgeon: Jan Ortega MD;  Location: [facility] END[facility];  Service: Gastroenterology;  Laterality: N/A;  normal    REPLACEMENT TOTAL KNEE      Right    TONSILLECTOMY        General Information       Row Name 24 1454          OT Time and Intention    Document Type evaluation  -SR       Row Name 24 1454          Occupational Profile    Reason for Services/Referral (Occupational Profile) Pt is an 89 y/o female who presented from home with SOA and BLE edema. Incidental finding of pancreatic lesion. Also with chest wall mass  (involving ribs 5 through 7 on R side) which has increased in size in the last 3 months. Pt has recurrent UTIs and is being treated for one currently. Pt lives with her dtr and JUAQUIN in a H with 1 HARSHIL. She is usually alone during the day M-F while her dtr works. She has a life alert and is able to walk short distances unassisted with her rollator.  -SR       Row Name 24 1454          Living Environment    People in Home child(erin), adult  -SR       Row Name 24 1454          Cognition    Orientation Status (Cognition) oriented x 3  -SR       Row Name  11/08/24 1454          Safety Issues/Impairments Affecting Functional Mobility    Impairments Affecting Function (Mobility) balance;strength;endurance/activity tolerance;pain;range of motion (ROM)  -SR               User Key  (r) = Recorded By, (t) = Taken By, (c) = Cosigned By      Initials Name Provider Type    SR Kelli Weinstein, OT Occupational Therapist                     Mobility/ADL's       Row Name 11/08/24 1454          Transfers    Transfers toilet transfer  -SR       Row Name 11/08/24 1454          Sit-Stand Transfer    Sit-Stand [place] (Transfers) moderate assist (50% patient effort)  -SR     Assistive Device (Sit-Stand Transfers) walker, front-wheeled  -SR       Row Name 11/08/24 1454          Toilet Transfer    [place] Level (Toilet Transfer) moderate assist (50% patient effort)  -SR     Comment, (Toilet Transfer) Pt demos significant posterior lean with standing and during step pivot to BSC and to chair.  -SR       Row Name 11/08/24 1454          Activities of Daily Living    BADL Assessment/Intervention toileting  -SR       Row Name 11/08/24 1454          Toileting Assessment/Training    [place] Level (Toileting) dependent (less than 25% patient effort);perform perineal hygiene  -SR               User Key  (r) = Recorded By, (t) = Taken By, (c) = Cosigned By      Initials Name Provider Type    SR Kelli Weinstein OT Occupational Therapist                   Obj/Interventions       Row Name 11/08/24 1456          Range of Motion Comprehensive    Comment, General Range of Motion BUE limited about 25% due to stiffness  -SR       Row Name 11/08/24 1456          Strength Comprehensive (MMT)    Comment, General Manual Muscle Testing (MMT) Assessment BUE 3+/5  -SR       Row Name 11/08/24 1456          Balance    Static Standing Balance moderate assist  -SR     Dynamic Standing Balance moderate assist;minimal assist  -SR               User Key  (r) = Recorded By, (t) = Taken By,  (c) = Cosigned By      Initials Name Provider Type    SR Kelli Weinstein, OT Occupational Therapist                   Goals/Plan       Row Name 11/08/24 1502          Dressing Goal 1 (OT)    Activity/Device (Dressing Goal 1, OT) lower body dressing  -SR     [place]/Cues Needed (Dressing Goal 1, OT) moderate assist (50-74% patient effort)  -SR     Time Frame (Dressing Goal 1, OT) 2 weeks  -SR       Row Name 11/08/24 1502          Toileting Goal 1 (OT)    Activity/Device (Toileting Goal 1, OT) toileting skills, all  -SR     [place] Level/Cues Needed (Toileting Goal 1, OT) moderate assist (50-74% patient effort)  -SR     Time Frame (Toileting Goal 1, OT) 2 weeks  -SR       Row Name 11/08/24 1502          Therapy Assessment/Plan (OT)    Planned Therapy Interventions (OT) activity tolerance training;BADL retraining;IADL retraining;functional balance retraining;neuromuscular control/coordination retraining;ROM/therapeutic exercise;transfer/mobility retraining;strengthening exercise  -SR               User Key  (r) = Recorded By, (t) = Taken By, (c) = Cosigned By      Initials Name Provider Type    SR Kelli Weinstein, OT Occupational Therapist                   Clinical Impression       Row Name 11/08/24 1500          Pain Assessment    Pretreatment Pain Rating 0/10 - no pain  -SR     Posttreatment Pain Rating 0/10 - no pain  -SR       Row Name 11/08/24 1500          Plan of Care Review    Outcome Evaluation Pt is an 88 y/o female who presented from home with SOA and BLE edema. Incidental finding of pancreatic lesion. Also with chest wall mass  (involving ribs 5 through 7 on R side) which has increased in size in the last 3 months. Pt has recurrent UTIs and is being treated for one currently. Pt lives with her dtr and JUAQUIN in a [facility] with 1 HAR[address]. She is usually alone during the day M-F while her dtr works. She has a life alert and is able to walk short distances unassisted with her rollator.  This date  pt demos generalized weakness and posterior lean with standing.  She requires assist for toileting tasks due to balance impairments. She appears to be far below [facility] and is not safe to return home.  Recommend SNF at discharge.  -SR       Row Name 11/08/24 1500          Therapy Assessment/Plan (OT)    Rehab Potential (OT) good  -SR     Criteria for Skilled Therapeutic Interventions Met (OT) yes  -SR     Therapy Frequency (OT) 5 times/wk  -SR       Row Name 11/08/24 1500          Therapy Plan Review/Discharge Plan (OT)    Anticipated Discharge Disposition (OT) skilled nursing facility  -SR       Row Name 11/08/24 1500          Positioning and Restraints    Pre-Treatment Position sitting in chair/recliner  -SR     Post Treatment Position chair  -SR     In Chair call light within reach;encouraged to call for assist;exit alarm on  -SR               User Key  (r) = Recorded By, (t) = Taken By, (c) = Cosigned By      Initials Name Provider Type    SR Kelli Weinstein, OT Occupational Therapist                   Outcome Measures       Row Name 11/08/24 1320 11/08/24 0800       How much help from another person do you currently need...    Turning from your back to your side while in flat bed without using bedrails? 2  -AH 2  -AT    Moving from lying on back to sitting on the side of a flat bed without bedrails? 2  -AH 2  -AT    Moving to and from a bed to a chair (including a wheelchair)? 2  -AH 2  -AT    Standing up from a chair using your arms (e.g., wheelchair, bedside chair)? 2  -AH 2  -AT    Climbing 3-5 steps with a railing? 1  -AH 2  -AT    To walk in hospital room? 2  -AH 2  -AT    AM-PAC 6 Clicks Score (PT) 11  -AH 12  -AT    Highest Level of Mobility Goal 4 --> Transfer to chair/commode  -AH 4 --> Transfer to chair/commode  -AT      Row Name 11/08/24 1320          Functional Assessment    Outcome Measure Options AM-PAC 6 Clicks Basic Mobility (PT)  -               User Key  (r) = Recorded By, (t) =  Taken By, (c) = Cosigned By      Initials Name Provider Type    AT Jody Cline LPN Licensed Nurse    Della Alvarado PT Physical Therapist                    Occupational Therapy Education       Title: PT OT SLP Therapies (In Progress)       Topic: Occupational Therapy (In Progress)       Point: ADL training (In Progress)       Description:   Instruct learner(s) on proper safety adaptation and remediation techniques during self care or transfers.   Instruct in proper use of assistive devices.                  Learning Progress Summary            Patient Acceptance, E,TB, NR by SR at 11/8/2024 1502                      Point: Home exercise program (Not Started)       Description:   Instruct learner(s) on appropriate technique for monitoring, assisting and/or progressing therapeutic exercises/activities.                  Learner Progress:  Not documented in this visit.              Point: Precautions (Not Started)       Description:   Instruct learner(s) on prescribed precautions during self-care and functional transfers.                  Learner Progress:  Not documented in this visit.              Point: Body mechanics (In Progress)       Description:   Instruct learner(s) on proper positioning and spine alignment during self-care, functional mobility activities and/or exercises.                  Learning Progress Summary            Patient Acceptance, E,TB, NR by SR at 11/8/2024 1502                                      User Key       Initials Effective Dates Name Provider Type Discipline     06/16/21 -  Kelli Weinstein, OT Occupational Therapist OT                  OT Recommendation and Plan  Planned Therapy Interventions (OT): activity tolerance training, BADL retraining, IADL retraining, functional balance retraining, neuromuscular control/coordination retraining, ROM/therapeutic exercise, transfer/mobility retraining, strengthening exercise  Therapy Frequency (OT): 5 times/wk  Plan of Care  Review  Outcome Evaluation: Pt is an 89 y/o female who presented from home with SOA and BLE edema. Incidental finding of pancreatic lesion. Also with chest wall mass  (involving ribs 5 through 7 on R side) which has increased in size in the last 3 months. Pt has recurrent UTIs and is being treated for one currently. Pt lives with her dtr and JUAQUIN in a H with 1 [address]. She is usually alone during the day M-F while her dtr works. She has a life alert and is able to walk short distances unassisted with her rollator.  This date pt demos generalized weakness and posterior lean with standing.  She requires assist for toileting tasks due to balance impairments. She appears to be far below [facility] and is not safe to return home.  Recommend SNF at discharge.     Time Calculation:         Time Calculation- OT       Row Name 11/08/24 1503             Time Calculation- OT    OT Start Time 1043  -SR      OT Stop Time 1100  -SR      OT Time Calculation (min) 17 min  -SR      Total Timed Code Minutes- OT 0 minute(s)  -SR      OT Received On 11/08/24  -SR      OT - Next Appointment 11/11/24  -SR      OT Goal Re-Cert Due Date 11/22/24  -SR                User Key  (r) = Recorded By, (t) = Taken By, (c) = Cosigned By      Initials Name Provider Type    SR Kelli Weinstein OT Occupational Therapist                  Therapy Charges for Today       Code Description Service Date Service Provider Modifiers Qty    [id number]  OT EVAL MOD COMPLEXITY 4 11/8/2024 Kelli Weinstein OT GO 1                 Kelli Weinstein OT  11/8/2024

## 2024-11-08 NOTE — PLAN OF CARE
Goal Outcome Evaluation:  Plan of Care Reviewed With: patient, child           Outcome Evaluation: Pt is an 89 y/o female who presented from home with SOA and BLE edema. Incidental finding of pancreatic lesion. Also with chest wall mass  (involving ribs 5 through 7 on R side) which has increased in size in the last 3 months. Pt has recurrent UTIs and is being treated for one currently. Pt lives with her dtr and JUAQUIN in a [facility] with 1 HARSHIL. She is usually alone during the day M-F while her dtr works. She has a life alert and is able to walk short distances unassisted with her rollator. Pt presents today below her baseline. She needs Larissa to stand up from recliner, then modA  to correct posterior lean/LOB upon standing with RW use. She is only able to take 3 shuffle steps, with modA needed from therapist to assist with wt shift to offload stepping LE. Pt is far below her baseline and is not safe to DC home at this time (due to increased assist needs and lack of 24/7 care available). PT recommends DC to SNF. PT will follow.    Anticipated Discharge Disposition (PT): skilled nursing facility

## 2024-11-08 NOTE — DISCHARGE PLACEMENT REQUEST
"Juany Lord (89 y.o. Female)       Date of Birth   1934    Social Security Number       Address   [address] [facility] [place] [address]    Home Phone   [phone]    MRN   [id number]       Islam   Patient Refused    Marital Status                               Admission Date   11/7/24    Admission Type   Emergency    Admitting Provider   Oleg Noyola MD    Attending Provider   Henry Dobson MD    Department, Room/Bed   [facility] 2D, 253/B       Discharge Date       Discharge Disposition       Discharge Destination                                 Attending Provider: Henry Dobsno MD    Allergies: Morphine, Ampicillin    Isolation: None   Infection: None   Code Status: CPR    Ht: 152.4 cm (60\")   Wt: 58.1 kg (128 lb)    Admission Cmt: None   Principal Problem: Shortness of breath [R06.02]                   Active Insurance as of 11/7/2024       Primary Coverage       Payor Plan Insurance Group Employer/Plan Group    MEDICARE MEDICARE A & B        Payor Plan Address Payor Plan Phone Number Payor Plan Fax Number Effective Dates    [address] [phone]  12/1/1999 - None Entered    [facility] [address]         Subscriber Name Subscriber Birth Date Member ID       JUANY LORD 1934 [id number]               Secondary Coverage       Payor Plan Insurance Group Employer/Plan Group    [employer] SUP AAR HEALTH CARE OPTIONS        Payor Plan Address Payor Plan Phone Number Payor Plan Fax Number Effective Dates    [facility] [phone]  1/1/2021 - None Entered    [address]       [facility] [address]         Subscriber Name Subscriber Birth Date Member ID       JUANY LORD 1934 [id number]                     Emergency Contacts        (Rel.) Home Phone Work Phone Mobile Phone    MAI VILLAGOMEZ (Daughter) [phone] -- --    MARY TOWNSEND (Daughter) [phone] -- --              Insurance Information                  " MEDICARE/MEDICARE A & B Phone: [phone]    Subscriber: Juany Dalton Subscriber#: [id number]    Group#: -- Precert#: --    Authorization#: npn Effective Date: --        [facility] HEALTH CARE OPTIONS Phone: [phone]    Subscriber: Juany Dalton Subscriber#: [id number]    Group#: -- Precert#: --    Authorization#: [id number] 2nd to  Effective Date: --

## 2024-11-08 NOTE — PLAN OF CARE
Goal Outcome Evaluation:Patient worked with PT/OT today and has been up in chair for several hours which she prefers. Is scheduled for MRI today and will need prn Ativan 30 minutes prior to. BP's were soft after Lasix so LR bolus 250ml ran and BP improved. Family (daughters) stays at bedside with patient. Spoke with Richar via secure chat and will see patient tonight or tomorrow. Venous u/s of legs was normal. D dimer elevated. GI now on board for pancreatic lesion.

## 2024-11-08 NOTE — CASE MANAGEMENT/SOCIAL WORK
Continued Stay Note   Justin     Patient Name: Juany Dalton  MRN: [id number]  Today's Date: 11/8/2024    Admit Date: 11/7/2024    Plan: From home with daughter, Radha and son in law. Referred to [facility] SN[facility]; accepted with ready bed on 11/11.  Will need PASRR. No precert needed. [facility]acy [facility]   Discharge Plan       Row Name 11/08/24 1215       Plan    Plan From home with daughterRadha and son in law. Referred to [facility] SNF; accepted with ready bed on 11/11.  Will need PASRR. No precert needed. [facility]acy [facility]    Plan Comments Per Jeannie with [facility], [facility] can accept with a ready bed on Monday, 11/11. Pharmacy changed to [facility]      Row Name 11/08/24 1150       Plan    Plan From home with  daughterRadha and son in law. Referred to [facility] SNF; acceptance pending. Will need PASRR. No precert needed. [facility]acy [facility] if accepted and will need to be changed    Patient/Family in Agreement with Plan yes    Provided Post Acute Provider List? Yes    Post Acute Provider List Nursing Home    Delivered To Support Person    Support Person Radha webber    Method of Delivery In person    Plan Comments Barriers: HemOnc consult pending for chest wall mass. ANDERS met with Mrs. Dalton who is a/o with her daughter, Radha at bedside. Her daughter said her mother lives with her and her  who both work. They have hired caregiver for 2 days per week for 2 hours. They are working with [employer] and [employer] for Medicaid and  caregivers but she is on the waiting list.  She has a rollator walker she uses at home and a transport wheelchair.  They were just getting started with [facility] for therapy and nursing.  CM discussed therapy recommendation for SNF at discharge. Their first choice is [facility]. CM made referral to Jeannie with [facility]; [facility] and added to Epic; acceptance pending. CM asked SW to complete PASRR. ANDERS gave her daughterRadha the written  list of area SNFs and the Medicare.gov website.  They denied financial concerns at home                          Elise BRADFORD,RN Case Manager  [facility]  Phone: Desk- [phone]  Cell- [phone]

## 2024-11-08 NOTE — CASE MANAGEMENT/SOCIAL WORK
Discharge Planning Assessment   Justin     Patient Name: Juany Dalton  MRN: [id number]  Today's Date: 11/8/2024    Admit Date: 11/7/2024    Plan: From home with  daughter, Radha and son in law. Referred to [facility]; acceptance pending. Will need PASRR. No precert needed. [facility]acy [facility] if accepted and will need to be changed   Discharge Needs Assessment       Row Name 11/08/24 1146       Living Environment    People in Home child(erin), adult    Name(s) of People in Home dtr, Radha and son in law    Unique Family Situation dtr and son in law work so has evening assistance and intermittent hired help 2hours/ 2 days per week    Current Living Arrangements home    Potentially Unsafe Housing Conditions none    In the past 12 months has the electric, gas, oil, or water company threatened to shut off services in your home? No    Primary Care Provided by self    Provides Primary Care For no one, unable/limited ability to care for self    Family Caregiver if Needed child(erin), adult    Family Caregiver Names Radha webber    Quality of Family Relationships supportive;involved;helpful    Able to Return to Prior Arrangements yes       Resource/Environmental Concerns    Resource/Environmental Concerns none    Transportation Concerns none       Transportation Needs    In the past 12 months, has lack of transportation kept you from medical appointments or from getting medications? no    In the past 12 months, has lack of transportation kept you from meetings, work, or from getting things needed for daily living? No       Food Insecurity    Within the past 12 months, you worried that your food would run out before you got the money to buy more. Never true    Within the past 12 months, the food you bought just didn't last and you didn't have money to get more. Never true       Transition Planning    Patient/Family Anticipates Transition to inpatient rehabilitation facility    Transportation Anticipated family or friend  will provide       Discharge Needs Assessment    Readmission Within the Last 30 Days no previous admission in last 30 days    Current Outpatient/Agency/Support Group skilled nursing facility    Equipment Currently Used at Home rollator;bp cuff;pulse ox;wheelchair;shower chair    Concerns to be Addressed discharge planning    Do you want help finding or keeping work or a job? I do not need or want help    Anticipated Changes Related to Illness none    Equipment Needed After Discharge none    Outpatient/Agency/Support Group Needs skilled nursing facility    Discharge Facility/Level of Care Needs nursing facility, skilled    Patient's Choice of Community Agency(s) [facility]                   Discharge Plan       Row Name 11/08/24 1150       Plan    Plan From home with  daughter, Radha and son in law. Referred to [facility] SNF; acceptance pending. Will need PASRR. No precert needed. [facility]acy [facility] if accepted and will need to be changed    Patient/Family in Agreement with Plan yes    Provided Post Acute Provider List? Yes    Post Acute Provider List Nursing Home    Delivered To Support Person    Support Person toryRadha kruse    Method of Delivery In person    Plan Comments Barriers: HemOnc consult pending for chest wall mass. ANDERS met with Mrs. Dalton who is a/o with her daughter, Radha at bedside. Her daughter said her mother lives with her and her  who both work. They have hired caregiver for 2 days per week for 2 hours. They are working with [employer] and [employer] for Medicaid and  caregivers but she is on the waiting list.  She has a rollator walker she uses at home and a transport wheelchair.  They were just getting started with [facility] for therapy and nursing.  CM discussed therapy recommendation for SNF at discharge. Their first choice is [facility]. CM made [facility] and added to Epic; acceptance pending. CM asked SW to complete  CHRISTOPHER. ANDERS gave her daughter, Radha the written list of area SNFs and the Medicare.gov website.  They denied financial concerns at home                  Continued Care and Services - Admitted Since 11/7/2024       Destination       Service Provider Request Status Services Address Phone Fax Patient Preferred    [facility] P[facility] [address] [facility] [place] [address] [phone] --                  Expected Discharge Date and Time       Expected Discharge Date Expected Discharge Time    Nov 9, 2024            Demographic Summary       Row Name 11/08/24 1145       General Information    Admission Type inpatient    Arrived From emergency department    Required Notices Provided Important Message from Medicare    Referral Source admission list    Reason for Consult discharge planning    Preferred Language English       Contact Information    Permission Granted to Share Info With                    Functional Status       Row Name 11/08/24 1145       Functional Status    Usual Activity Tolerance good    Current Activity Tolerance moderate       Functional Status, IADL    Medications assistive person    Meal Preparation assistive person    Housekeeping completely dependent    Laundry completely dependent    Shopping completely dependent    If for any reason you need help with day-to-day activities such as bathing, preparing meals, shopping, managing finances, etc., do you get the help you need? I could use a little more help    IADL Comments Working with [employer] and [employer] for help at home       Mental Status    General Appearance WDL WDL       Mental Status Summary    Recent Changes in Mental Status/Cognitive Functioning no changes                       Patient Forms       Row Name 11/08/24 1142       Patient Forms    Important Message from Medicare (IMM) Delivered  IMM signed by dtr with CM 11/08    Delivered to Support person    Method of  delivery In person                      Elise HERNANDEZN,RN Case Manager  [facility]  Phone: Desk- [phone] cell- [phone]

## 2024-11-08 NOTE — CASE MANAGEMENT/SOCIAL WORK
Social Work Assessment  [facility]     Patient Name: Juany Dalton  MRN: [id number]  Today's Date: 11/8/2024    Admit Date: 11/7/2024       Discharge Plan       Row Name 11/08/24 1349       Plan    Plan From home with daughter, Radha and son in law. Referred to [facility]; accepted with ready bed on 11/11. PASRR QFR. No precert needed. Pharmacy Synchron[facility]    Plan Comments CM updated on status of PASRR.            Eileen Acharya, WILLIAM, LSW    Phone: [phone]  Fax: [phone]  [email or web address]

## 2024-11-08 NOTE — NURSING NOTE
Consult received for a 89-year-old female to assess bilateral heels for stage I pressure injuries.  Photos in the chart are noted.  Recommend to continue with implementing hospital protocols for treatment and pressure injury prevention

## 2024-11-09 ENCOUNTER — INPATIENT HOSPITAL (OUTPATIENT)
Age: 89
End: 2024-11-09
Payer: COMMERCIAL

## 2024-11-09 ENCOUNTER — INPATIENT HOSPITAL (OUTPATIENT)
Dept: URBAN - METROPOLITAN AREA HOSPITAL 84 | Facility: HOSPITAL | Age: 89
End: 2024-11-09
Payer: COMMERCIAL

## 2024-11-09 DIAGNOSIS — K86.9 DISEASE OF PANCREAS, UNSPECIFIED: ICD-10-CM

## 2024-11-09 LAB
ALBUMIN SERPL-MCNC: 3.9 G/DL (ref 3.5–5.2)
ALBUMIN/GLOB SERPL: 1.4 G/DL
ALP SERPL-CCNC: 43 U/L (ref 39–117)
ALT SERPL W P-5'-P-CCNC: 17 U/L (ref 1–33)
ANION GAP SERPL CALCULATED.3IONS-SCNC: 11.8 MMOL/L (ref 5–15)
AST SERPL-CCNC: 31 U/L (ref 1–32)
BASOPHILS # BLD AUTO: 0.04 10*3/MM3 (ref 0–0.2)
BASOPHILS NFR BLD AUTO: 0.8 % (ref 0–1.5)
BILIRUB SERPL-MCNC: 0.3 MG/DL (ref 0–1.2)
BUN SERPL-MCNC: 19 MG/DL (ref 8–23)
BUN/CREAT SERPL: 14.7 (ref 7–25)
CALCIUM SPEC-SCNC: 9.4 MG/DL (ref 8.6–10.5)
CHLORIDE SERPL-SCNC: 100 MMOL/L (ref 98–107)
CO2 SERPL-SCNC: 23.2 MMOL/L (ref 22–29)
CREAT SERPL-MCNC: 1.29 MG/DL (ref 0.57–1)
DEPRECATED RDW RBC AUTO: 42.4 FL (ref 37–54)
EGFRCR SERPLBLD CKD-EPI 2021: 39.8 ML/MIN/1.73
EOSINOPHIL # BLD AUTO: 0.21 10*3/MM3 (ref 0–0.4)
EOSINOPHIL NFR BLD AUTO: 4.2 % (ref 0.3–6.2)
ERYTHROCYTE [DISTWIDTH] IN BLOOD BY AUTOMATED COUNT: 13.3 % (ref 12.3–15.4)
GLOBULIN UR ELPH-MCNC: 2.7 GM/DL
GLUCOSE SERPL-MCNC: 107 MG/DL (ref 65–99)
HCT VFR BLD AUTO: 33 % (ref 34–46.6)
HGB BLD-MCNC: 10.7 G/DL (ref 12–15.9)
IMM GRANULOCYTES # BLD AUTO: 0.02 10*3/MM3 (ref 0–0.05)
IMM GRANULOCYTES NFR BLD AUTO: 0.4 % (ref 0–0.5)
LYMPHOCYTES # BLD AUTO: 0.88 10*3/MM3 (ref 0.7–3.1)
LYMPHOCYTES NFR BLD AUTO: 17.6 % (ref 19.6–45.3)
MAGNESIUM SERPL-MCNC: 1.9 MG/DL (ref 1.6–2.4)
MCH RBC QN AUTO: 28.1 PG (ref 26.6–33)
MCHC RBC AUTO-ENTMCNC: 32.4 G/DL (ref 31.5–35.7)
MCV RBC AUTO: 86.6 FL (ref 79–97)
MONOCYTES # BLD AUTO: 0.56 10*3/MM3 (ref 0.1–0.9)
MONOCYTES NFR BLD AUTO: 11.2 % (ref 5–12)
NEUTROPHILS NFR BLD AUTO: 3.3 10*3/MM3 (ref 1.7–7)
NEUTROPHILS NFR BLD AUTO: 65.8 % (ref 42.7–76)
NRBC BLD AUTO-RTO: 0 /100 WBC (ref 0–0.2)
PHOSPHATE SERPL-MCNC: 4.2 MG/DL (ref 2.5–4.5)
PLATELET # BLD AUTO: 203 10*3/MM3 (ref 140–450)
PMV BLD AUTO: 9 FL (ref 6–12)
POTASSIUM SERPL-SCNC: 3.9 MMOL/L (ref 3.5–5.2)
PROT SERPL-MCNC: 6.6 G/DL (ref 6–8.5)
RBC # BLD AUTO: 3.81 10*6/MM3 (ref 3.77–5.28)
SODIUM SERPL-SCNC: 135 MMOL/L (ref 136–145)
WBC NRBC COR # BLD AUTO: 5.01 10*3/MM3 (ref 3.4–10.8)

## 2024-11-09 PROCEDURE — 99232 SBSQ HOSP IP/OBS MODERATE 35: CPT | Mod: FS | Performed by: NURSE PRACTITIONER

## 2024-11-09 PROCEDURE — 25010000002 ENOXAPARIN PER 10 MG

## 2024-11-09 PROCEDURE — 99221 1ST HOSP IP/OBS SF/LOW 40: CPT | Performed by: INTERNAL MEDICINE

## 2024-11-09 PROCEDURE — 85025 COMPLETE CBC W/AUTO DIFF WBC: CPT | Performed by: NURSE PRACTITIONER

## 2024-11-09 PROCEDURE — 84100 ASSAY OF PHOSPHORUS: CPT | Performed by: STUDENT IN AN ORGANIZED HEALTH CARE EDUCATION/TRAINING PROGRAM

## 2024-11-09 PROCEDURE — 83735 ASSAY OF MAGNESIUM: CPT | Performed by: STUDENT IN AN ORGANIZED HEALTH CARE EDUCATION/TRAINING PROGRAM

## 2024-11-09 PROCEDURE — 80053 COMPREHEN METABOLIC PANEL: CPT | Performed by: NURSE PRACTITIONER

## 2024-11-09 RX ORDER — PRAMIPEXOLE DIHYDROCHLORIDE 1 MG/1
1 TABLET ORAL ONCE
Status: COMPLETED | OUTPATIENT
Start: 2024-11-09 | End: 2024-11-09

## 2024-11-09 RX ORDER — PRAMIPEXOLE DIHYDROCHLORIDE 1 MG/1
1 TABLET ORAL NIGHTLY
Status: DISCONTINUED | OUTPATIENT
Start: 2024-11-09 | End: 2024-11-12 | Stop reason: HOSPADM

## 2024-11-09 RX ORDER — FUROSEMIDE 20 MG/1
20 TABLET ORAL DAILY
Status: DISCONTINUED | OUTPATIENT
Start: 2024-11-09 | End: 2024-11-12 | Stop reason: HOSPADM

## 2024-11-09 RX ORDER — MIDODRINE HYDROCHLORIDE 5 MG/1
5 TABLET ORAL
Status: DISCONTINUED | OUTPATIENT
Start: 2024-11-10 | End: 2024-11-12 | Stop reason: HOSPADM

## 2024-11-09 RX ORDER — TRAMADOL HYDROCHLORIDE 50 MG/1
50 TABLET ORAL EVERY 12 HOURS PRN
Status: DISCONTINUED | OUTPATIENT
Start: 2024-11-09 | End: 2024-11-11

## 2024-11-09 RX ADMIN — CYCLOBENZAPRINE 10 MG: 10 TABLET, FILM COATED ORAL at 20:49

## 2024-11-09 RX ADMIN — METOPROLOL TARTRATE 25 MG: 25 TABLET, FILM COATED ORAL at 07:31

## 2024-11-09 RX ADMIN — Medication 10 ML: at 20:50

## 2024-11-09 RX ADMIN — PRAMIPEXOLE DIHYDROCHLORIDE 1 MG: 1 TABLET ORAL at 14:59

## 2024-11-09 RX ADMIN — SENNOSIDES AND DOCUSATE SODIUM 2 TABLET: 50; 8.6 TABLET ORAL at 20:49

## 2024-11-09 RX ADMIN — TRAMADOL HYDROCHLORIDE 50 MG: 50 TABLET, COATED ORAL at 22:01

## 2024-11-09 RX ADMIN — SENNOSIDES AND DOCUSATE SODIUM 2 TABLET: 50; 8.6 TABLET ORAL at 07:30

## 2024-11-09 RX ADMIN — Medication 12.5 MG: at 17:23

## 2024-11-09 RX ADMIN — Medication 10 ML: at 07:31

## 2024-11-09 RX ADMIN — MIDODRINE HYDROCHLORIDE 10 MG: 5 TABLET ORAL at 12:27

## 2024-11-09 RX ADMIN — MIDODRINE HYDROCHLORIDE 10 MG: 5 TABLET ORAL at 07:30

## 2024-11-09 RX ADMIN — PENTOXIFYLLINE 400 MG: 400 TABLET, EXTENDED RELEASE ORAL at 20:49

## 2024-11-09 RX ADMIN — MIDODRINE HYDROCHLORIDE 10 MG: 5 TABLET ORAL at 17:23

## 2024-11-09 RX ADMIN — CYCLOBENZAPRINE 10 MG: 10 TABLET, FILM COATED ORAL at 15:00

## 2024-11-09 RX ADMIN — PENTOXIFYLLINE 400 MG: 400 TABLET, EXTENDED RELEASE ORAL at 07:30

## 2024-11-09 RX ADMIN — LIDOCAINE 1 PATCH: 4 PATCH TOPICAL at 20:49

## 2024-11-09 RX ADMIN — CYCLOBENZAPRINE 10 MG: 10 TABLET, FILM COATED ORAL at 07:30

## 2024-11-09 RX ADMIN — FUROSEMIDE 20 MG: 20 TABLET ORAL at 20:49

## 2024-11-09 RX ADMIN — PRAMIPEXOLE DIHYDROCHLORIDE 1 MG: 1 TABLET ORAL at 20:49

## 2024-11-09 RX ADMIN — ENOXAPARIN SODIUM 30 MG: 100 INJECTION SUBCUTANEOUS at 15:00

## 2024-11-09 RX ADMIN — MIRTAZAPINE 15 MG: 15 TABLET, FILM COATED ORAL at 20:49

## 2024-11-09 NOTE — PROGRESS NOTES
[facility] MEDICINE SERVICE  DAILY PROGRESS NOTE    NAME: Juany Dalton  : 1934  MRN: [id number]      LOS: 1 day     PROVIDER OF SERVICE: Henry Dobson MD    Chief Complaint: Shortness of breath    Subjective:     Interval History:  History taken from: patient  No acute overnight events, Pt reports restless leg, stated she is on medications for it, wondering if dose can be increased, otherwise seen by oncology today, GI following, plan for biopsy per oncology, otherwise denies chest pain, fever or chills.     Review of Systems:   Review of Systems    Objective:     Vital Signs  Temp:  [97.2 °F (36.2 °C)-98 °F (36.7 °C)] 97.2 °F (36.2 °C)  Heart Rate:  [72-92] 72  Resp:  [18-22] 22  BP: ()/(37-71) 144/58   Body mass index is 25 kg/m².    Physical Exam  General Appearance:  Awake alert   Head:  Atraumatic normocephalic   Eyes:        No scleral icterus   Neck: Normal range of motion   Pulm: Decreased breath sounds   Cardio: Heart rate normal, regular rhythm   Extremities: Edema present on the bilateral extremities slight redness noted   Abdomen: Soft nontender         Musculoskeletal: Moves all extremities   Neuro: Awake, alert, conversational, normal speech                 Scheduled Meds   albumin human, 250 mL, Intravenous, Once  cyclobenzaprine, 10 mg, Oral, TID  enoxaparin, 30 mg, Subcutaneous, Q24H  latanoprost, 1 drop, Both Eyes, Nightly  Lidocaine, 1 patch, Transdermal, Nightly  metoprolol tartrate, 12.5 mg, Oral, BID With Meals  midodrine, 10 mg, Oral, TID AC  mirtazapine, 15 mg, Oral, Nightly  pentoxifylline, 400 mg, Oral, BID  pramipexole, 1 mg, Oral, Nightly  senna-docusate sodium, 2 tablet, Oral, BID  sodium chloride, 10 mL, Intravenous, Q12H       PRN Meds     senna-docusate sodium **AND** polyethylene glycol **AND** bisacodyl **AND** bisacodyl    Calcium Replacement - Follow Nurse / BPA Driven Protocol    dicyclomine    HYDROmorphone    HYDROmorphone **AND** naloxone     Magnesium Standard Dose Replacement - Follow Nurse / BPA Driven Protocol    nitroglycerin    Phosphorus Replacement - Follow Nurse / BPA Driven Protocol    Potassium Replacement - Follow Nurse / BPA Driven Protocol    sodium chloride    sodium chloride    sodium chloride    sodium chloride   Infusions         Diagnostic Data    Results from last 7 days   Lab Units 11/09/24  0537   WBC 10*3/mm3 5.01   HEMOGLOBIN g/dL 10.7*   HEMATOCRIT % 33.0*   PLATELETS 10*3/mm3 203   GLUCOSE mg/dL 107*   CREATININE mg/dL 1.29*   BUN mg/dL 19   SODIUM mmol/L 135*   POTASSIUM mmol/L 3.9   AST (SGOT) U/L 31   ALT (SGPT) U/L 17   ALK PHOS U/L 43   BILIRUBIN mg/dL 0.3   ANION GAP mmol/L 11.8       MRI Abdomen Without Contrast    Result Date: 11/8/2024  Impression: 1. Multiple exam limitations as above. 2. Ill-defined mass in the pancreatic head measuring 1.4 cm potentially representing a primary malignancy (pancreatic adenocarcinoma). Metastatic disease from other primary is possible. Consider correlation with EUS/FNA. 3. Infiltrative right anterior chest wall and left posterolateral chest wall masses highly suspicious for malignancy, probably metastatic. 1.9 cm nodule in the right retroperitoneum appearing similar by T2 weighted and diffusion weighted sequences suspicious for metastatic disease. Differential includes lymphoma. 4. Cholelithiasis. No findings to suggest acute cholecystitis or biliary obstruction. 5. Body wall edema. Electronically Signed: Carlos Flores MD  11/8/2024 10:08 PM EST  Workstation ID: [id number]    CT Chest With Contrast Diagnostic    Addendum Date: 11/8/2024    ADDENDUM #1 Addendum begins. Request to specifically assess for pulmonary embolism. No filling defect in the pulmonary arteries to suggest embolism. Addendum ends. Electronically Signed: Carlos Flores MD  11/8/2024 2:46 PM EST  Workstation ID: [id number] ORIGINAL REPORT: CT CHEST W CONTRAST DIAGNOSTIC Date of Exam: 11/7/2024 4:56 PM EST Indication:  sternal mass. Comparison: CT chest 2/5/2009 Technique: Axial CT images were obtained of the chest after the uneventful intravenous administration of iodinated contrast.  Sagittal and coronal reconstructions were performed.  Automated exposure control and iterative reconstruction methods were used. Findings: Thyroid grossly unremarkable. No suspicious supraclavicular adenopathy. Aortic atherosclerotic disease without aneurysm. Severe coronary atherosclerotic disease. Mitral annular calcifications. Heart size within normal limits. No pericardial effusion. Normal caliber main pulmonary artery. No suspicious mediastinal or hilar adenopathy. No convincing internal mammary chain adenopathy. There is fluid in the midesophagus. Trachea patent. Negative for infectious consolidation, edema, effusion or pneumothorax. Fairly symmetric biapical pleural-parenchymal scarring. Few small nodular densities in the right apex without significant change from 2009 favored chronic/benign. No overtly suspicious pulmonary nodule. Benign densely calcified left lower lobe pulmonary granuloma. There is a soft tissue mass in the midline/right paramidline lower chest wall measuring up to 3.1 x 4.2 x 10 cm AP, transverse and craniocaudal dimension example image 80 series 2. Mass extends through the chest wall into the right cardiophrenic space image 89 series 2 probably involving portions of the anterior fifth, sixth and seventh costal cartilage and intercostal spaces. No overtly suspicious axillary adenopathy or other discrete chest wall mass. Degenerative elated changes throughout the spine. Previous kyphoplasty at T11 and T12 with chronic compression fractures. There is up to 6 mm bony retropulsion at T12. Minimal retrolisthesis L1 on L2, L2 on L3 and anterolisthesis L3 on L4 likely degenerative. Gallstones without findings of acute cholecystitis. No suspicious liver lesion. Punctate nonobstructing right upper pole renal calculus. No dilation  of biliary tree. Ill-defined hypodensity measuring 1.4 cm in the medial aspect of pancreatic head image 102 series 2. Upstream pancreatic duct is slightly prominent measuring 3 mm. Impression: 1. Locally invasive right chest wall mass involving costal cartilage and intercostal spaces centered at anterior fifth through seventh ribs. Findings concerning for malignancy, possibly of breast malignancy although differential may include sarcoma, lymphoma or metastatic disease from other etiology. Correlate with tissue sampling. 2. No convincing adenopathy or suspicious pulmonary nodule in the chest. 3. Ill-defined hypodense lesion in the pancreatic head. Recommend abdominal MRI without and with IV contrast to exclude mass. 4. No acute airspace process. Chronic biapical pleural-parenchymal scarring. 5. Aortic and coronary atherosclerotic disease. 6. Cholelithiasis without findings of acute cholecystitis. 7. Other chronic/ancillary findings as above. Electronically Signed: Carlos Flores MD  11/7/2024 5:38 PM EST  Workstation ID: [id number]    Result Date: 11/8/2024  Impression: 1. Locally invasive right chest wall mass involving costal cartilage and intercostal spaces centered at anterior fifth through seventh ribs. Findings concerning for malignancy, possibly of breast malignancy although differential may include sarcoma, lymphoma or metastatic disease from other etiology. Correlate with tissue sampling. 2. No convincing adenopathy or suspicious pulmonary nodule in the chest. 3. Ill-defined hypodense lesion in the pancreatic head. Recommend abdominal MRI without and with IV contrast to exclude mass. 4. No acute airspace process. Chronic biapical pleural-parenchymal scarring. 5. Aortic and coronary atherosclerotic disease. 6. Cholelithiasis without findings of acute cholecystitis. 7. Other chronic/ancillary findings as above. Electronically Signed: Carlos Flores MD  11/7/2024 5:38 PM EST  Workstation ID: [id number]       I  reviewed the patient's new clinical results.    Assessment/Plan:   Juany Dalton is a 89 y.o. female with a CMH of chronic back pain, osteoporosis, arthritis, history of left breast lumpectomy (in the 70s) that was benign, lower extremity lymphedema, growing chest mass x 3 months who presented to [facility] on 11/7/2024 with shortness of breath and bilateral lower extremity edema progressively gotten worse in the past week.  His night sweats, weight loss, cough, chest pain, nausea vomiting or diarrhea. Of note, patient noticed a mid chest mass in August that her PCP suspected to be a bony protrusion.  She saw her PCP in the past week for a UTI and her PCP ordered a chest CT due to increased growth of the mass.     Assessments:  #Dyspnea with bilateral lower extremities edema: Rule out CHF  #Right chest wall mass involving 5th through 7th ribs, Measuring 3.1 x 4.2 x 10 cm   #Status post biopsy in 2021 left breast, by Dr. Gallegos, pathology results were benign   #hypodense lesion in the pancreatic head, measuring 1.4 cm, MRI pending for further eval  # ANIBAL, previous baseline creatinine was around 1.05s  #Cholelithiasis     Plan:  -Oncology consulted, MRI pending pancreatic head lesion, will consult GI pending MRI results  -She had a biopsy in 2021 pathology showing benign results, discussed with patient daughter, she could remember  -Will start on Lasix 20 daily given her soft BP, monitor strict ins and outs, echo is pending will follow  -Continue home meds will follow  -VTE prophylaxis  -AM labs will follow    11/9:   -Oncology reccs noted   -BP improved with Fluids yesterday, will back off midodrine to 5 TID from 10, if BP remain stable, nikolay start PO lasix roni  -Pramipexole increased  -On DVT ppx, am labs  -On going work up      VTE Prophylaxis:  Pharmacologic VTE prophylaxis orders are present.         Code status is   Code Status and Medical Interventions: CPR (Attempt to Resuscitate); Full Support    Ordered at: 11/07/24 1957     Code Status (Patient has no pulse and is not breathing):    CPR (Attempt to Resuscitate)     Medical Interventions (Patient has pulse or is breathing):    Full Support       Plan for disposition:Oncology workup     Time: 30 minutes    Part of this note may be an electronic transcription/translation of spoken language to printed text using the Dragon Dictation System.    Signature: Electronically signed by Henry Dobson MD, 11/09/24, 16:02 [facility].  [facility] Hospitalist Team

## 2024-11-09 NOTE — PROGRESS NOTES
" LOS: 1 day   Patient Care Team:  Orion Salmeron MD as PCP - General (Family Medicine)      Subjective   \"OK\"    Interval History:   Labs: CA 19-9 is 43.4.  Sodium 135, potassium 3.9, creatinine 1.29 (1.46).  LFTs are normal.  WBCs 5.09, hemoglobin 10.7, MCV 86.6, platelets 203.  MRI without contrast 11/8/2024 ill-defined mass in the pancreatic head measuring 1.4 cm potentially representing a primary malignancy (pancreatic adenocarcinoma.  Metastatic disease from other primary is a possibility.  Infiltrative right anterior chest wall and left posterior lateral chest wall masses highly suspicious for malignancy probable metastatic.  1.9 cm nodule in the right retroperitoneal pillars similar, suspicious for metastatic disease.  Differential includes lymphoma.  Cholelithiasis.  Body wall edema.    ROS:   No chest pain, shortness of breath, or cough.         Medication Review:     Current Facility-Administered Medications:     albumin human 5 % solution 250 mL, 250 mL, Intravenous, Once, Henry Dobson MD    sennosides-docusate (PERICOLACE) 8.6-50 MG per tablet 2 tablet, 2 tablet, Oral, BID, 2 tablet at 11/09/24 0730 **AND** polyethylene glycol (MIRALAX) packet 17 g, 17 g, Oral, Daily PRN **AND** bisacodyl (DULCOLAX) EC tablet 5 mg, 5 mg, Oral, Daily PRN **AND** bisacodyl (DULCOLAX) suppository 10 mg, 10 mg, Rectal, Daily PRN, Oleg Noyola MD    Calcium Replacement - Follow Nurse / BPA Driven Protocol, , Does not apply, PRN, Oleg Noyola MD    cyclobenzaprine (FLEXERIL) tablet 10 mg, 10 mg, Oral, TID, Oleg Noyola MD, 10 mg at 11/09/24 0730    dicyclomine (BENTYL) capsule 20 mg, 20 mg, Oral, Daily PRN, Oleg Noyola MD    Enoxaparin Sodium (LOVENOX) syringe 30 mg, 30 mg, Subcutaneous, Q24H, Oleg Noyola MD, 30 mg at 11/08/24 1631    HYDROmorphone (DILAUDID) injection 0.25 mg, 0.25 mg, Intravenous, Q4H PRN, Henry Dobson MD, 0.25 mg at 11/08/24 1759    HYDROmorphone " (DILAUDID) injection 0.5 mg, 0.5 mg, Intravenous, Q2H PRN **AND** naloxone (NARCAN) injection 0.4 mg, 0.4 mg, Intravenous, Q5 Min PRN, Oleg Noyola MD    latanoprost (XALATAN) 0.005 % ophthalmic solution 1 drop, 1 drop, Both Eyes, Nightly, Oleg Noyola MD    Lidocaine 4 % 1 patch, 1 patch, Transdermal, Nightly, Oleg Noyola MD, 1 patch at 11/08/24 2159    Magnesium Standard Dose Replacement - Follow Nurse / BPA Driven Protocol, , Does not apply, PRN, Oleg Noyola MD    metoprolol tartrate (LOPRESSOR) half tablet 12.5 mg, 12.5 mg, Oral, BID With Meals, Henry Dobson MD    midodrine (PROAMATINE) tablet 10 mg, 10 mg, Oral, TID AC, Henry Dobson MD, 10 mg at 11/09/24 0730    mirtazapine (REMERON) tablet 15 mg, 15 mg, Oral, Nightly, Oleg Noyola MD, 15 mg at 11/08/24 2203    nitroglycerin (NITROSTAT) SL tablet 0.4 mg, 0.4 mg, Sublingual, Q5 Min PRN, Oleg Noyola MD    pentoxifylline (TRENtal) CR tablet 400 mg, 400 mg, Oral, BID, Oleg Noyola MD, 400 mg at 11/09/24 0730    Phosphorus Replacement - Follow Nurse / BPA Driven Protocol, , Does not apply, PRJazmín ESQUIVEL Benjamin C, MD    Potassium Replacement - Follow Nurse / BPA Driven Protocol, , Does not apply, PRJazmín ESQUIVEL Benjamin C, MD    pramipexole (MIRAPEX) tablet 0.5 mg, 0.5 mg, Oral, Nightly, Oleg Noyola MD, 0.5 mg at 11/08/24 2203    sodium chloride 0.9 % flush 10 mL, 10 mL, Intravenous, PRN, Jan Ogden MD    sodium chloride 0.9 % flush 10 mL, 10 mL, Intravenous, PRN, Oleg Noyola MD    sodium chloride 0.9 % flush 10 mL, 10 mL, Intravenous, Q12H, Oleg Noyola MD, 10 mL at 11/09/24 0731    sodium chloride 0.9 % flush 10 mL, 10 mL, Intravenous, PRN, Oleg Noyola MD    sodium chloride 0.9 % infusion 40 mL, 40 mL, Intravenous, PRN, Oleg Noyola MD      Objective up in a chair eating breakfast.  NAD.  Daughter at bedside.    Vital Signs  Temp:  [97.5 °F (36.4 °C)-98.3 °F (36.8  "°C)] 98 °F (36.7 °C)  Heart Rate:  [67-92] 74  Resp:  [18-21] 19  BP: ()/(33-71) 110/51  Physical Exam:    General Appearance:    Awake and alert, in no acute distress   Head:    Normocephalic, without obvious abnormality   Eyes:          Conjunctivae normal, anicteric sclerae   Ears:    Hearing intact   Throat:   No oral lesions, no thrush, oral mucosa moist   Neck:   No adenopathy, supple, no JVD   Lungs:     respirations regular, even and unlabored        Abdomen:     Soft, non-tender, no rebound or guarding, non-distended, no hepatosplenomegaly   Rectal:     Deferred   Extremities:   No edema, no cyanosis, no redness   Skin:   No bleeding, bruising or rash, no jaundice         Results Review:    CBC    Results from last 7 days   Lab Units 11/09/24  0537 11/08/24  0420 11/07/24  1451   WBC 10*3/mm3 5.01 6.04 5.88   HEMOGLOBIN g/dL 10.7* 9.9* 11.0*   PLATELETS 10*3/mm3 203 191 229     CMP   Results from last 7 days   Lab Units 11/09/24  0537 11/08/24  0420 11/07/24  1743 11/07/24  1451   SODIUM mmol/L 135* 133*  --  133*   POTASSIUM mmol/L 3.9 4.2  --  4.7   CHLORIDE mmol/L 100 99  --  97*   CO2 mmol/L 23.2 22.4  --  21.8*   BUN mg/dL 19 20  --  19   CREATININE mg/dL 1.29* 1.46*  --  1.28*   GLUCOSE mg/dL 107* 90  --  96   ALBUMIN g/dL 3.9 3.8  --  4.5   BILIRUBIN mg/dL 0.3 0.4  --  0.3   ALK PHOS U/L 43 43  --  50   AST (SGOT) U/L 31 30  --  28   ALT (SGPT) U/L 17 15  --  18   MAGNESIUM mg/dL 1.9 2.2 1.9  --    PHOSPHORUS mg/dL 4.2 4.6*  --   --      Cr Clearance Estimated Creatinine Clearance: 23.6 mL/min (A) (by C-G formula based on SCr of 1.29 mg/dL (H)).  Coag     HbA1C No results found for: \"HGBA1C\"  Blood Glucose No results found for: \"POCGLU\"  Infection     UA    Results from last 7 days   Lab Units 11/07/24  1452   NITRITE UA  Negative   WBC UA /HPF 0-2   BACTERIA UA /HPF None Seen   SQUAM EPITHEL UA /HPF 0-2     Radiology(recent) MRI Abdomen Without Contrast    Result Date: 11/8/2024  Impression: " 1. Multiple exam limitations as above. 2. Ill-defined mass in the pancreatic head measuring 1.4 cm potentially representing a primary malignancy (pancreatic adenocarcinoma). Metastatic disease from other primary is possible. Consider correlation with EUS/FNA. 3. Infiltrative right anterior chest wall and left posterolateral chest wall masses highly suspicious for malignancy, probably metastatic. 1.9 cm nodule in the right retroperitoneum appearing similar by T2 weighted and diffusion weighted sequences suspicious for metastatic disease. Differential includes lymphoma. 4. Cholelithiasis. No findings to suggest acute cholecystitis or biliary obstruction. 5. Body wall edema. Electronically Signed: Carlos Flores MD  11/8/2024 10:08 PM EST  Workstation ID: [id number]    CT Chest With Contrast Diagnostic    Addendum Date: 11/8/2024    ADDENDUM #1 Addendum begins. Request to specifically assess for pulmonary embolism. No filling defect in the pulmonary arteries to suggest embolism. Addendum ends. Electronically Signed: Carlos Flores MD  11/8/2024 2:46 PM EST  Workstation ID: [id number] ORIGINAL REPORT: CT CHEST W CONTRAST DIAGNOSTIC Date of Exam: 11/7/2024 4:56 PM EST Indication: sternal mass. Comparison: CT chest 2/5/2009 Technique: Axial CT images were obtained of the chest after the uneventful intravenous administration of iodinated contrast.  Sagittal and coronal reconstructions were performed.  Automated exposure control and iterative reconstruction methods were used. Findings: Thyroid grossly unremarkable. No suspicious supraclavicular adenopathy. Aortic atherosclerotic disease without aneurysm. Severe coronary atherosclerotic disease. Mitral annular calcifications. Heart size within normal limits. No pericardial effusion. Normal caliber main pulmonary artery. No suspicious mediastinal or hilar adenopathy. No convincing internal mammary chain adenopathy. There is fluid in the midesophagus. Trachea patent. Negative  for infectious consolidation, edema, effusion or pneumothorax. Fairly symmetric biapical pleural-parenchymal scarring. Few small nodular densities in the right apex without significant change from 2009 favored chronic/benign. No overtly suspicious pulmonary nodule. Benign densely calcified left lower lobe pulmonary granuloma. There is a soft tissue mass in the midline/right paramidline lower chest wall measuring up to 3.1 x 4.2 x 10 cm AP, transverse and craniocaudal dimension example image 80 series 2. Mass extends through the chest wall into the right cardiophrenic space image 89 series 2 probably involving portions of the anterior fifth, sixth and seventh costal cartilage and intercostal spaces. No overtly suspicious axillary adenopathy or other discrete chest wall mass. Degenerative elated changes throughout the spine. Previous kyphoplasty at T11 and T12 with chronic compression fractures. There is up to 6 mm bony retropulsion at T12. Minimal retrolisthesis L1 on L2, L2 on L3 and anterolisthesis L3 on L4 likely degenerative. Gallstones without findings of acute cholecystitis. No suspicious liver lesion. Punctate nonobstructing right upper pole renal calculus. No dilation of biliary tree. Ill-defined hypodensity measuring 1.4 cm in the medial aspect of pancreatic head image 102 series 2. Upstream pancreatic duct is slightly prominent measuring 3 mm. Impression: 1. Locally invasive right chest wall mass involving costal cartilage and intercostal spaces centered at anterior fifth through seventh ribs. Findings concerning for malignancy, possibly of breast malignancy although differential may include sarcoma, lymphoma or metastatic disease from other etiology. Correlate with tissue sampling. 2. No convincing adenopathy or suspicious pulmonary nodule in the chest. 3. Ill-defined hypodense lesion in the pancreatic head. Recommend abdominal MRI without and with IV contrast to exclude mass. 4. No acute airspace process.  Chronic biapical pleural-parenchymal scarring. 5. Aortic and coronary atherosclerotic disease. 6. Cholelithiasis without findings of acute cholecystitis. 7. Other chronic/ancillary findings as above. Electronically Signed: Carlos Flores MD  11/7/2024 5:38 PM EST  Workstation ID: [id number]    Result Date: 11/8/2024  Impression: 1. Locally invasive right chest wall mass involving costal cartilage and intercostal spaces centered at anterior fifth through seventh ribs. Findings concerning for malignancy, possibly of breast malignancy although differential may include sarcoma, lymphoma or metastatic disease from other etiology. Correlate with tissue sampling. 2. No convincing adenopathy or suspicious pulmonary nodule in the chest. 3. Ill-defined hypodense lesion in the pancreatic head. Recommend abdominal MRI without and with IV contrast to exclude mass. 4. No acute airspace process. Chronic biapical pleural-parenchymal scarring. 5. Aortic and coronary atherosclerotic disease. 6. Cholelithiasis without findings of acute cholecystitis. 7. Other chronic/ancillary findings as above. Electronically Signed: Carlos Flores MD  11/7/2024 5:38 PM EST  Workstation ID: [id number]    XR Chest 1 View    Result Date: 11/7/2024  Impression: No active disease. Electronically Signed: Cedric Henry MD  11/7/2024 3:40 PM EST  Workstation ID: [id number]           Assessment & Plan   -Abnormal CT showing right chest wall mass and hypodense lesion in the pancreatic head  -Normocytic anemia  -ANIBAL  -Electrolyte abnormalities  -Chest wall mass  -Cholelithiasis  -Chronic back pain  -History of appendectomy     PLAN:  Patient is an 89-year-old female with history of chronic back pain and appendectomy who presented on 11/7 with complaints of shortness of breath and lower extremity edema.  Being treated for fluid overload by primary care team.  GI has been asked to consult due to abnormal CT showing pancreatic head lesion.    CA 19-9 is 9 is 43.  MRI  reviewed.  Discussed consulting oncology.  Discussed biopsy of pancreas or chest masses.  Daughter at bedside would like to discuss with daughter that is coming in and patient before any consults were made.  Supportive care.  Pain control as daughters main concern.  Discussed potential palliative consult.         Mary Smith, APRN  11/09/24  10:17 EST

## 2024-11-09 NOTE — PLAN OF CARE
Goal Outcome Evaluation:  Plan of Care Reviewed With: patient, family        Progress: no change  Outcome Evaluation: Patient alert and oriented, VSS. MRI partially completed due to patient not following commands during procedure. Family remains at bedside. Complaints of pain on and off but patient refused pain medication when offered.

## 2024-11-09 NOTE — CONSULTS
Hematology/Oncology Inpatient Consultation    Patient name: Juany Dalton  : 1934  MRN: [id number]  Referring Provider: Dr. Dobson.   Reason for Consultation: Apparent metastatic malignancy.     Chief complaint: Dyspnea and bilateral lower extremity edema.     History of present illness:    Juany Dalton is a 89 y.o. female who presented to [facility] on 2024 with complaints of     2024: I was asked to see Ms. Dalton. She was brought to the hospital by her family. For a few days she had been having more dyspnea and edema of the lower extremities. In the process of investigating the cause of her symptoms she had a CT scan of the chest. It reported a locally invasive right chest wall tumor involving the costal cartilage and intercostal spaces, centered at the anterior fifth to seventh ribs. This was felt to potentially correspond to a malignant process. A hypodense lesion in the pancreatic head was present, as well. Later a MRI of the abdomen was performed and indeed an ill defined tumor of the pancreatic head was documented. It measured 1.4 cm and was felt to potentially correspond to a primary pancreatic malignancy. However, a metastatic lesion could not be ruled out either. She admitted to enlargement of a nodule on the anterior chest wall that was fist noted in 2024. At first this was felt to be an inflammatory process. However when it did not improve she was referred for a scan that had to be postponed because of the admission to the hospital. She admitted to weight loss and profound anorexia. She had not experienced any abdominal pain or nausea. She had been defecating regularly and had not had melena or hematochezia. No edema. No skin rash. On exam alert, ill appearing and in no distress. No jaundice but pale. Well hydrated. Lungs clear and heart regular. Indeed there is a tumor easily palpable on the anterior right chest, involving the most medial portion of the  inferior breast. The breast, however, is pendulous and without dimpling or ulceration of the skin. No nipple discharge. Abdomen soft. There is some lower extremity edema. The images of the chest are highly suggestive of malignancy. The source could be the pancreas but it is impossible to rule out other possibilities including a right breast cancer. A biopsy is essential for diagnosis and to determine the source. While at this point no plans for treatment have been made, we have agreed on a biopsy. Discussed the possible sources of this apparent malignancy. Explained to her and her family that this by no means would be a curable disease and the role of palliative and symptomatic treatment could have. Radiation for palliation of pain was also explained. Will try to obtain a biopsy of the anterior chest tumor.     He/She  has a past medical history of Arthritis, Back pain, Breast lump, Hypertension, Leg cramps, Osteoporosis, and Tremor.    PCP: Orion Salmeron MD    History:  Past Medical History:   Diagnosis Date    Arthritis     Back pain     Breast lump     Hypertension     Leg cramps     Osteoporosis     Tremor    ,   Past Surgical History:   Procedure Laterality Date    APPENDECTOMY      BREAST LUMPECTOMY      Left    ENDOSCOPY N/A 8/30/2022    Procedure: ESOPHAGOGASTRODUODENOSCOPY;  Surgeon: Jan Ortega MD;  Location: [facility];  Service: Gastroenterology;  Laterality: N/A;  normal    REPLACEMENT TOTAL KNEE      Right    TONSILLECTOMY     , History reviewed. No pertinent family history.,   Social History     Tobacco Use    Smoking status: Never    Smokeless tobacco: Never   Vaping Use    Vaping status: Never Used   Substance Use Topics    Alcohol use: Not Currently    Drug use: Not Currently   ,   Medications Prior to Admission   Medication Sig Dispense Refill Last Dose/Taking    latanoprost (XALATAN) 0.005 % ophthalmic solution Administer 1 drop to both eyes Every Night.   11/6/2024     metoprolol tartrate (LOPRESSOR) 25 MG tablet Take 1 tablet by mouth 2 (Two) Times a Day With Meals.   11/7/2024    mirtazapine (REMERON) 15 MG tablet Take 1 tablet by mouth Every Night.   11/6/2024    pentoxifylline (TRENtal) 400 MG CR tablet Take 1 tablet by mouth 2 (Two) Times a Day.   11/7/2024    pramipexole (MIRAPEX) 0.5 MG tablet Take 1 tablet by mouth every night at bedtime.   11/6/2024    sulfamethoxazole-trimethoprim (BACTRIM DS,SEPTRA DS) 800-160 MG per tablet Take 1 tablet by mouth 2 (Two) Times a Day.   11/7/2024    Denosumab (PROLIA SC) Inject  under the skin into the appropriate area as directed Every 6 (Six) Hours.   9/9/2024    dicyclomine (BENTYL) 20 MG tablet Take 1 tablet by mouth As Needed for Abdominal Cramping.       traMADol (ULTRAM) 50 MG tablet Take 1 tablet by mouth Every 6 (Six) Hours As Needed.      , Scheduled Meds:  albumin human, 250 mL, Intravenous, Once  cyclobenzaprine, 10 mg, Oral, TID  enoxaparin, 30 mg, Subcutaneous, Q24H  latanoprost, 1 drop, Both Eyes, Nightly  Lidocaine, 1 patch, Transdermal, Nightly  metoprolol tartrate, 12.5 mg, Oral, BID With Meals  midodrine, 10 mg, Oral, TID AC  mirtazapine, 15 mg, Oral, Nightly  pentoxifylline, 400 mg, Oral, BID  pramipexole, 0.5 mg, Oral, Nightly  senna-docusate sodium, 2 tablet, Oral, BID  sodium chloride, 10 mL, Intravenous, Q12H    , Continuous Infusions:   , PRN Meds:    senna-docusate sodium **AND** polyethylene glycol **AND** bisacodyl **AND** bisacodyl    Calcium Replacement - Follow Nurse / BPA Driven Protocol    dicyclomine    HYDROmorphone    HYDROmorphone **AND** naloxone    Magnesium Standard Dose Replacement - Follow Nurse / BPA Driven Protocol    nitroglycerin    Phosphorus Replacement - Follow Nurse / BPA Driven Protocol    Potassium Replacement - Follow Nurse / BPA Driven Protocol    sodium chloride    sodium chloride    sodium chloride    sodium chloride   Allergies:  Morphine and Ampicillin    Subjective  "    ROS:  Review of Systems   Constitutional:  Positive for appetite change and unexpected weight change. Negative for activity change, chills, diaphoresis, fatigue and fever.   HENT:  Negative for congestion, dental problem, drooling, ear discharge, ear pain, facial swelling, hearing loss, mouth sores, nosebleeds, postnasal drip, rhinorrhea, sinus pressure, sinus pain, sneezing, sore throat, tinnitus, trouble swallowing and voice change.    Eyes:  Negative for photophobia, pain, discharge, redness, itching and visual disturbance.   Respiratory:  Positive for shortness of breath. Negative for apnea, cough, choking, chest tightness, wheezing and stridor.    Cardiovascular:  Positive for chest pain and leg swelling. Negative for palpitations.   Gastrointestinal:  Negative for abdominal distention, abdominal pain, anal bleeding, blood in stool, constipation, diarrhea, nausea, rectal pain and vomiting.   Endocrine: Negative for cold intolerance, heat intolerance, polydipsia and polyuria.   Genitourinary:  Negative for decreased urine volume, difficulty urinating, dysuria, flank pain, frequency, genital sores, hematuria and urgency.   Musculoskeletal:  Negative for arthralgias, back pain, gait problem, joint swelling, myalgias, neck pain and neck stiffness.   Skin:  Negative for color change, pallor and rash.   Neurological:  Negative for dizziness, tremors, seizures, syncope, facial asymmetry, speech difficulty, weakness, light-headedness, numbness and headaches.   Hematological:  Negative for adenopathy. Does not bruise/bleed easily.   Psychiatric/Behavioral:  Negative for agitation, behavioral problems, confusion, decreased concentration, hallucinations, self-injury, sleep disturbance and suicidal ideas. The patient is not nervous/anxious.       Objective   Vital Signs:   /58   Pulse 72   Temp 97.2 °F (36.2 °C)   Resp 22   Ht 152.4 cm (60\")   Wt 58.1 kg (128 lb)   SpO2 97%   BMI 25.00 kg/m²     Physical " Exam: (performed by MD)  Physical Exam  Constitutional:       General: She is not in acute distress.     Appearance: She is not ill-appearing, toxic-appearing or diaphoretic.      Comments: Well built. Oriented and conversant. Pale but not jaundiced.    HENT:      Head: Normocephalic and atraumatic.      Right Ear: External ear normal.      Left Ear: External ear normal.      Nose: Nose normal.      Mouth/Throat:      Mouth: Mucous membranes are moist.      Pharynx: Oropharynx is clear. No oropharyngeal exudate or posterior oropharyngeal erythema.   Eyes:      General: No scleral icterus.        Right eye: No discharge.         Left eye: No discharge.      Conjunctiva/sclera: Conjunctivae normal.      Pupils: Pupils are equal, round, and reactive to light.   Cardiovascular:      Rate and Rhythm: Normal rate and regular rhythm.      Pulses: Normal pulses.      Heart sounds: No murmur heard.     No friction rub. No gallop.   Pulmonary:      Effort: No respiratory distress.      Breath sounds: No stridor. No wheezing, rhonchi or rales.   Chest:      Comments: There is a large palpable tumor that does not result on ulceration or change of the skin on the anterior right chest. It involves the most inferior and medial aspect of the right breast. It feels deeper than the breast and there are no associated changes of the skin of the breast. No nipple discharge.   Abdominal:      General: Bowel sounds are normal. There is no distension.      Palpations: Abdomen is soft.      Tenderness: There is no abdominal tenderness.   Musculoskeletal:         General: No tenderness, deformity or signs of injury.      Cervical back: No rigidity.      Right lower leg: Edema present.      Left lower leg: Edema present.   Lymphadenopathy:      Cervical: No cervical adenopathy.   Skin:     Coloration: Skin is pale. Skin is not jaundiced.      Findings: No bruising, lesion or rash.   Neurological:      General: No focal deficit present.       Mental Status: She is alert and oriented to person, place, and time.      Cranial Nerves: No cranial nerve deficit.      Motor: No weakness.      Gait: Gait normal.   Psychiatric:         Mood and Affect: Mood normal.         Behavior: Behavior normal.         Thought Content: Thought content normal.         Judgment: Judgment normal.     ONEL Villagran MD performed the physical exam on 11/9/2024 as documented above.     Results Review:  Lab Results (last 48 hours)       Procedure Component Value Units Date/Time    Comprehensive Metabolic Panel [[id number]]  (Abnormal) Collected: 11/09/24 0537    Specimen: Blood from Arm, Left Updated: 11/09/24 0618     Glucose 107 mg/dL      BUN 19 mg/dL      Creatinine 1.29 mg/dL      Sodium 135 mmol/L      Potassium 3.9 mmol/L      Chloride 100 mmol/L      CO2 23.2 mmol/L      Calcium 9.4 mg/dL      Total Protein 6.6 g/dL      Albumin 3.9 g/dL      ALT (SGPT) 17 U/L      AST (SGOT) 31 U/L      Alkaline Phosphatase 43 U/L      Total Bilirubin 0.3 mg/dL      Globulin 2.7 gm/dL      A/G Ratio 1.4 g/dL      BUN/Creatinine Ratio 14.7     Anion Gap 11.8 mmol/L      eGFR 39.8 mL/min/1.73     Narrative:      GFR Normal >60  Chronic Kidney Disease <60  Kidney Failure <15    The GFR formula is only valid for adults with stable renal function between ages 18 and 70.    Magnesium [[id number]]  (Normal) Collected: 11/09/24 0537    Specimen: Blood from Arm, Left Updated: 11/09/24 0618     Magnesium 1.9 mg/dL     Phosphorus [[id number]]  (Normal) Collected: 11/09/24 0537    Specimen: Blood from Arm, Left Updated: 11/09/24 0618     Phosphorus 4.2 mg/dL     CBC & Differential [[id number]]  (Abnormal) Collected: 11/09/24 0537    Specimen: Blood from Arm, Left Updated: 11/09/24 0559    Narrative:      The following orders were created for panel order CBC & Differential.  Procedure                               Abnormality         Status                     ---------                                -----------         ------                     CBC Auto Differential[[id number]]        Abnormal            Final result                 Please view results for these tests on the individual orders.    CBC Auto Differential [[id number]]  (Abnormal) Collected: 11/09/24 0537    Specimen: Blood from Arm, Left Updated: 11/09/24 0559     WBC 5.01 10*3/mm3      RBC 3.81 10*6/mm3      Hemoglobin 10.7 g/dL      Hematocrit 33.0 %      MCV 86.6 fL      MCH 28.1 pg      MCHC 32.4 g/dL      RDW 13.3 %      RDW-SD 42.4 fl      MPV 9.0 fL      Platelets 203 10*3/mm3      Neutrophil % 65.8 %      Lymphocyte % 17.6 %      Monocyte % 11.2 %      Eosinophil % 4.2 %      Basophil % 0.8 %      Immature Grans % 0.4 %      Neutrophils, Absolute 3.30 10*3/mm3      Lymphocytes, Absolute 0.88 10*3/mm3      Monocytes, Absolute 0.56 10*3/mm3      Eosinophils, Absolute 0.21 10*3/mm3      Basophils, Absolute 0.04 10*3/mm3      Immature Grans, Absolute 0.02 10*3/mm3      nRBC 0.0 /100 WBC     Cancer Antigen 19-9 [[id number]]  (Abnormal) Collected: 11/08/24 1444    Specimen: Blood from Arm, Left Updated: 11/08/24 2004     CA 19-9 43.4 U/mL     Narrative:      Results may be falsely decreased if patient taking Biotin.    Testing Method: Roche [employer] Electrochemiluminescence Immunoassay(ECLIA)  Values obtained with different assay methods or kits cannot be used interchangeably.    D-dimer, Quantitative [[id number]]  (Abnormal) Collected: 11/08/24 1249    Specimen: Blood from Arm, Right Updated: 11/08/24 1320     D-Dimer, Quantitative 1.92 MCGFEU/mL     Narrative:      According to the assay 's published package insert, a normal (<0.50 MCGFEU/mL) D-dimer result in conjunction with a non-high clinical probability assessment, excludes deep vein thrombosis (DVT) and pulmonary embolism (PE) with high sensitivity.    D-dimer values increase with age and this can make VTE exclusion of an older population difficult. To address this, the  "[employer], based on best available evidence and recent guidelines, recommends that clinicians use age-adjusted D-dimer thresholds in patients greater than 50 years of age with: a) a low probability of PE who do not meet all Pulmonary Embolism Rule Out Criteria, or b) in those with intermediate probability of PE.   The formula for an age-adjusted D-dimer cut-off is \"age/100\".  For example, a 60 year old patient would have an age-adjusted cut-off of 0.60 MCGFEU/mL and an 80 year old 0.80 MCGFEU/mL.    CBC & Differential [[id number]]  (Abnormal) Collected: 11/08/24 0420    Specimen: Blood from Arm, Left Updated: 11/08/24 0533    Narrative:      The following orders were created for panel order CBC & Differential.  Procedure                               Abnormality         Status                     ---------                               -----------         ------                     Manual Differential[[id number]]          Abnormal            Final result               CBC Auto Differential[[id number]]        Abnormal            Final result                 Please view results for these tests on the individual orders.    Manual Differential [[id number]]  (Abnormal) Collected: 11/08/24 0420    Specimen: Blood from Arm, Left Updated: 11/08/24 0533     Neutrophil % 72.0 %      Lymphocyte % 19.0 %      Monocyte % 3.0 %      Bands %  5.0 %      Atypical Lymphocyte % 1.0 %      Neutrophils Absolute 4.65 10*3/mm3      Lymphocytes Absolute 1.21 10*3/mm3      Monocytes Absolute 0.18 10*3/mm3      Acanthocytes Slight/1+     Anisocytosis Slight/1+     Crenated RBC's Slight/1+     Dacrocytes Slight/1+     Microcytes Slight/1+     Poikilocytes Slight/1+     WBC Morphology Normal     Platelet Estimate Adequate    CBC Auto Differential [[id number]]  (Abnormal) Collected: 11/08/24 0420    Specimen: Blood from Arm, Left Updated: 11/08/24 0533     WBC 6.04 10*3/mm3      RBC 3.59 10*6/mm3      Hemoglobin 9.9 g/dL      " Hematocrit 30.7 %      MCV 85.5 fL      MCH 27.6 pg      MCHC 32.2 g/dL      RDW 13.4 %      RDW-SD 42.0 fl      MPV 9.1 fL      Platelets 191 10*3/mm3     Comprehensive Metabolic Panel [[id number]]  (Abnormal) Collected: 11/08/24 0420    Specimen: Blood from Arm, Left Updated: 11/08/24 0514     Glucose 90 mg/dL      BUN 20 mg/dL      Creatinine 1.46 mg/dL      Sodium 133 mmol/L      Potassium 4.2 mmol/L      Comment: Slight hemolysis detected by analyzer. Result may be falsely elevated.        Chloride 99 mmol/L      CO2 22.4 mmol/L      Calcium 9.0 mg/dL      Total Protein 6.2 g/dL      Albumin 3.8 g/dL      ALT (SGPT) 15 U/L      AST (SGOT) 30 U/L      Alkaline Phosphatase 43 U/L      Total Bilirubin 0.4 mg/dL      Globulin 2.4 gm/dL      A/G Ratio 1.6 g/dL      BUN/Creatinine Ratio 13.7     Anion Gap 11.6 mmol/L      eGFR 34.3 mL/min/1.73     Narrative:      GFR Normal >60  Chronic Kidney Disease <60  Kidney Failure <15    The GFR formula is only valid for adults with stable renal function between ages 18 and 70.    Magnesium [[id number]]  (Normal) Collected: 11/08/24 0420    Specimen: Blood from Arm, Left Updated: 11/08/24 0514     Magnesium 2.2 mg/dL     Phosphorus [[id number]]  (Abnormal) Collected: 11/08/24 0420    Specimen: Blood from Arm, Left Updated: 11/08/24 0514     Phosphorus 4.6 mg/dL     Magnesium [[id number]]  (Normal) Collected: 11/07/24 1743    Specimen: Blood Updated: 11/07/24 2133     Magnesium 1.9 mg/dL     High Sensitivity Troponin T 2Hr [[id number]]  (Abnormal) Collected: 11/07/24 1743    Specimen: Blood Updated: 11/07/24 1807     HS Troponin T 54 ng/L      Troponin T Delta 0 ng/L     Narrative:      High Sensitive Troponin T Reference Range:  <14.0 ng/L- Negative Female for AMI  <22.0 ng/L- Negative Male for AMI  >=14 - Abnormal Female indicating possible myocardial injury.  >=22 - Abnormal Male indicating possible myocardial injury.   Clinicians would have to utilize clinical acumen, EKG,  Troponin, and serial changes to determine if it is an Acute Myocardial Infarction or myocardial injury due to an underlying chronic condition.         High Sensitivity Troponin T [[id number]]  (Abnormal) Collected: 11/07/24 1539    Specimen: Blood Updated: 11/07/24 1607     HS Troponin T 54 ng/L     Narrative:      High Sensitive Troponin T Reference Range:  <14.0 ng/L- Negative Female for AMI  <22.0 ng/L- Negative Male for AMI  >=14 - Abnormal Female indicating possible myocardial injury.  >=22 - Abnormal Male indicating possible myocardial injury.   Clinicians would have to utilize clinical acumen, EKG, Troponin, and serial changes to determine if it is an Acute Myocardial Infarction or myocardial injury due to an underlying chronic condition.         Urinalysis, Microscopic Only - Straight Cath [[id number]] Collected: 11/07/24 1452    Specimen: Urine from Straight Cath Updated: 11/07/24 1559     RBC, UA None Seen /HPF      WBC, UA 0-2 /HPF      Comment: Urine culture not indicated.        Bacteria, UA None Seen /HPF      Squamous Epithelial Cells, UA 0-2 /HPF      Hyaline Casts, UA None Seen /LPF      Methodology Manual Light Microscopy    Comprehensive Metabolic Panel [[id number]]  (Abnormal) Collected: 11/07/24 1451    Specimen: Blood Updated: 11/07/24 1523     Glucose 96 mg/dL      BUN 19 mg/dL      Creatinine 1.28 mg/dL      Sodium 133 mmol/L      Potassium 4.7 mmol/L      Chloride 97 mmol/L      CO2 21.8 mmol/L      Calcium 9.7 mg/dL      Total Protein 6.9 g/dL      Albumin 4.5 g/dL      ALT (SGPT) 18 U/L      AST (SGOT) 28 U/L      Alkaline Phosphatase 50 U/L      Total Bilirubin 0.3 mg/dL      Globulin 2.4 gm/dL      A/G Ratio 1.9 g/dL      BUN/Creatinine Ratio 14.8     Anion Gap 14.2 mmol/L      eGFR 40.1 mL/min/1.73     Narrative:      GFR Normal >60  Chronic Kidney Disease <60  Kidney Failure <15    The GFR formula is only valid for adults with stable renal function between ages 18 and 70.    BNP  [[id number]]  (Normal) Collected: 11/07/24 1451    Specimen: Blood Updated: 11/07/24 1523     proBNP 946.0 pg/mL     Narrative:      This assay is used as an aid in the diagnosis of individuals suspected of having heart failure. It can be used as an aid in the diagnosis of acute decompensated heart failure (ADHF) in patients presenting with signs and symptoms of ADHF to the emergency department (ED). In addition, NT-proBNP of <300 pg/mL indicates ADHF is not likely.    Age Range Result Interpretation  NT-proBNP Concentration (pg/mL:      <50             Positive            >450                   Gray                 300-450                    Negative             <300    50-75           Positive            >900                  Gray                300-900                  Negative            <300      >75             Positive            >1800                  Gray                300-1800                  Negative            <300    Urinalysis With Culture If Indicated - Straight Cath [[id number]]  (Abnormal) Collected: 11/07/24 1452    Specimen: Urine from Straight Cath Updated: 11/07/24 1514     Color, UA Yellow     Appearance, UA Clear     pH, UA 6.0     Specific Gravity, UA 1.010     Glucose, UA Negative     Ketones, UA Negative     Bilirubin, UA Negative     Blood, UA Trace     Protein, UA Negative     Leuk Esterase, UA Trace     Nitrite, UA Negative     Urobilinogen, UA 0.2 E.U./dL    Narrative:      In absence of clinical symptoms, the presence of pyuria, bacteria, and/or nitrites on the urinalysis result does not correlate with infection.    CBC & Differential [[id number]]  (Abnormal) Collected: 11/07/24 1451    Specimen: Blood Updated: 11/07/24 1501    Narrative:      The following orders were created for panel order CBC & Differential.  Procedure                               Abnormality         Status                     ---------                               -----------         ------                      CBC Auto Differential[[id number]]        Abnormal            Final result                 Please view results for these tests on the individual orders.    CBC Auto Differential [[id number]]  (Abnormal) Collected: 11/07/24 1451    Specimen: Blood Updated: 11/07/24 1501     WBC 5.88 10*3/mm3      RBC 3.97 10*6/mm3      Hemoglobin 11.0 g/dL      Hematocrit 34.5 %      MCV 86.9 fL      MCH 27.7 pg      MCHC 31.9 g/dL      RDW 13.5 %      RDW-SD 42.9 fl      MPV 9.1 fL      Platelets 229 10*3/mm3      Neutrophil % 77.9 %      Lymphocyte % 12.6 %      Monocyte % 7.0 %      Eosinophil % 1.9 %      Basophil % 0.3 %      Immature Grans % 0.3 %      Neutrophils, Absolute 4.58 10*3/mm3      Lymphocytes, Absolute 0.74 10*3/mm3      Monocytes, Absolute 0.41 10*3/mm3      Eosinophils, Absolute 0.11 10*3/mm3      Basophils, Absolute 0.02 10*3/mm3      Immature Grans, Absolute 0.02 10*3/mm3      nRBC 0.0 /100 WBC     [place] Draw [[id number]] Collected: 11/07/24 1450    Specimen: Blood Updated: 11/07/24 1500    Narrative:      The following orders were created for panel order [place] Draw.  Procedure                               Abnormality         Status                     ---------                               -----------         ------                     Green Top (Gel)[[id number]]                                  Final result               Lavender Top[[id number]]                                     Final result               Gold Top - SST[[id number]]                                   Final result               Light Blue Top[[id number]]                                   Final result                 Please view results for these tests on the individual orders.    Green Top (Gel) [[id number]] Collected: 11/07/24 1450    Specimen: Blood Updated: 11/07/24 1500     Extra Tube Hold for add-ons.     Comment: Auto resulted.       Gold Top - SST [[id number]] Collected: 11/07/24 1451    Specimen: Blood Updated: 11/07/24 1500     Extra  Tube Hold for add-ons.     Comment: Auto resulted.       Lavender Top [[id number]] Collected: 11/07/24 1451    Specimen: Blood Updated: 11/07/24 1500     Extra Tube hold for add-on     Comment: Auto resulted       Light Blue Top [[id number]] Collected: 11/07/24 1451    Specimen: Blood Updated: 11/07/24 1500     Extra Tube Hold for add-ons.     Comment: Auto resulted             Imaging Reviewed:   MRI Abdomen Without Contrast    Result Date: 11/8/2024  Impression: 1. Multiple exam limitations as above. 2. Ill-defined mass in the pancreatic head measuring 1.4 cm potentially representing a primary malignancy (pancreatic adenocarcinoma). Metastatic disease from other primary is possible. Consider correlation with EUS/FNA. 3. Infiltrative right anterior chest wall and left posterolateral chest wall masses highly suspicious for malignancy, probably metastatic. 1.9 cm nodule in the right retroperitoneum appearing similar by T2 weighted and diffusion weighted sequences suspicious for metastatic disease. Differential includes lymphoma. 4. Cholelithiasis. No findings to suggest acute cholecystitis or biliary obstruction. 5. Body wall edema. Electronically Signed: Carlos Flores MD  11/8/2024 10:08 PM EST  Workstation ID: [id number]    CT Chest With Contrast Diagnostic    Addendum Date: 11/8/2024    ADDENDUM #1 Addendum begins. Request to specifically assess for pulmonary embolism. No filling defect in the pulmonary arteries to suggest embolism. Addendum ends. Electronically Signed: Carlos Flores MD  11/8/2024 2:46 PM EST  Workstation ID: [id number] ORIGINAL REPORT: CT CHEST W CONTRAST DIAGNOSTIC Date of Exam: 11/7/2024 4:56 PM EST Indication: sternal mass. Comparison: CT chest 2/5/2009 Technique: Axial CT images were obtained of the chest after the uneventful intravenous administration of iodinated contrast.  Sagittal and coronal reconstructions were performed.  Automated exposure control and iterative reconstruction methods  were used. Findings: Thyroid grossly unremarkable. No suspicious supraclavicular adenopathy. Aortic atherosclerotic disease without aneurysm. Severe coronary atherosclerotic disease. Mitral annular calcifications. Heart size within normal limits. No pericardial effusion. Normal caliber main pulmonary artery. No suspicious mediastinal or hilar adenopathy. No convincing internal mammary chain adenopathy. There is fluid in the midesophagus. Trachea patent. Negative for infectious consolidation, edema, effusion or pneumothorax. Fairly symmetric biapical pleural-parenchymal scarring. Few small nodular densities in the right apex without significant change from 2009 favored chronic/benign. No overtly suspicious pulmonary nodule. Benign densely calcified left lower lobe pulmonary granuloma. There is a soft tissue mass in the midline/right paramidline lower chest wall measuring up to 3.1 x 4.2 x 10 cm AP, transverse and craniocaudal dimension example image 80 series 2. Mass extends through the chest wall into the right cardiophrenic space image 89 series 2 probably involving portions of the anterior fifth, sixth and seventh costal cartilage and intercostal spaces. No overtly suspicious axillary adenopathy or other discrete chest wall mass. Degenerative elated changes throughout the spine. Previous kyphoplasty at T11 and T12 with chronic compression fractures. There is up to 6 mm bony retropulsion at T12. Minimal retrolisthesis L1 on L2, L2 on L3 and anterolisthesis L3 on L4 likely degenerative. Gallstones without findings of acute cholecystitis. No suspicious liver lesion. Punctate nonobstructing right upper pole renal calculus. No dilation of biliary tree. Ill-defined hypodensity measuring 1.4 cm in the medial aspect of pancreatic head image 102 series 2. Upstream pancreatic duct is slightly prominent measuring 3 mm. Impression: 1. Locally invasive right chest wall mass involving costal cartilage and intercostal spaces  centered at anterior fifth through seventh ribs. Findings concerning for malignancy, possibly of breast malignancy although differential may include sarcoma, lymphoma or metastatic disease from other etiology. Correlate with tissue sampling. 2. No convincing adenopathy or suspicious pulmonary nodule in the chest. 3. Ill-defined hypodense lesion in the pancreatic head. Recommend abdominal MRI without and with IV contrast to exclude mass. 4. No acute airspace process. Chronic biapical pleural-parenchymal scarring. 5. Aortic and coronary atherosclerotic disease. 6. Cholelithiasis without findings of acute cholecystitis. 7. Other chronic/ancillary findings as above. Electronically Signed: Carlos Flores MD  11/7/2024 5:38 PM EST  Workstation ID: [id number]    Result Date: 11/8/2024  Impression: 1. Locally invasive right chest wall mass involving costal cartilage and intercostal spaces centered at anterior fifth through seventh ribs. Findings concerning for malignancy, possibly of breast malignancy although differential may include sarcoma, lymphoma or metastatic disease from other etiology. Correlate with tissue sampling. 2. No convincing adenopathy or suspicious pulmonary nodule in the chest. 3. Ill-defined hypodense lesion in the pancreatic head. Recommend abdominal MRI without and with IV contrast to exclude mass. 4. No acute airspace process. Chronic biapical pleural-parenchymal scarring. 5. Aortic and coronary atherosclerotic disease. 6. Cholelithiasis without findings of acute cholecystitis. 7. Other chronic/ancillary findings as above. Electronically Signed: Carlos Flores MD  11/7/2024 5:38 PM EST  Workstation ID: [id number]    XR Chest 1 View    Result Date: 11/7/2024  Impression: No active disease. 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 Likely malignant. The source possible pancreatic but other sources have to be considered. While treatment has not been discussed and given the apparent easy of a chest tumor biopsy, we have agreed on it to allow diagnosis and potentially discuss treatment. She is unlikely to receive any treatment. This is an incurable condition.   Dyspnea and peripheral edema: Likely a result of both cardiac disease and chronic renal disease. Improved.   Reviewed all the records. Reviewed the images of the scans and discussed with her and her daughters.     PLAN  As above.     Electronically signed by Genaro Villagran MD, 11/09/24, 1:05 PM EST.

## 2024-11-09 NOTE — PLAN OF CARE
Problem: Adult Inpatient Plan of Care  Goal: Plan of Care Review  Outcome: Progressing  Flowsheets (Taken 11/9/2024 1522)  Progress: no change  Plan of Care Reviewed With:   patient   child  Goal: Patient-Specific Goal (Individualized)  Outcome: Progressing  Goal: Absence of Hospital-Acquired Illness or Injury  Outcome: Progressing  Intervention: Identify and Manage Fall Risk  Recent Flowsheet Documentation  Taken 11/9/2024 1429 by Karin Perez LPN  Safety Promotion/Fall Prevention:   activity supervised   assistive device/personal items within reach   clutter free environment maintained   lighting adjusted   nonskid shoes/slippers when out of bed   room organization consistent   safety round/check completed  Taken 11/9/2024 1222 by Karin Perez LPN  Safety Promotion/Fall Prevention:   activity supervised   assistive device/personal items within reach   clutter free environment maintained   lighting adjusted   nonskid shoes/slippers when out of bed   room organization consistent   safety round/check completed  Taken 11/9/2024 1022 by Karin Perez LPN  Safety Promotion/Fall Prevention:   activity supervised   assistive device/personal items within reach   clutter free environment maintained   lighting adjusted   nonskid shoes/slippers when out of bed   room organization consistent   safety round/check completed  Taken 11/9/2024 0843 by Karin Perez LPN  Safety Promotion/Fall Prevention:   activity supervised   assistive device/personal items within reach   clutter free environment maintained   lighting adjusted   nonskid shoes/slippers when out of bed   room organization consistent   safety round/check completed  Intervention: Prevent Skin Injury  Recent Flowsheet Documentation  Taken 11/9/2024 0843 by Karin Perez LPN  Body Position: position changed independently  Skin Protection: incontinence pads utilized  Intervention: Prevent and Manage VTE (Venous Thromboembolism) Risk  Recent Flowsheet Documentation  Taken  11/9/2024 0843 by Karin Perez LPN  VTE Prevention/Management: SCDs (sequential compression devices) off  Intervention: Prevent Infection  Recent Flowsheet Documentation  Taken 11/9/2024 1429 by Karin Perez LPN  Infection Prevention:   hand hygiene promoted   rest/sleep promoted   single patient room provided  Taken 11/9/2024 1222 by Karin Perez LPN  Infection Prevention:   hand hygiene promoted   rest/sleep promoted   single patient room provided  Taken 11/9/2024 1022 by Karin Perez LPN  Infection Prevention:   hand hygiene promoted   rest/sleep promoted   single patient room provided  Taken 11/9/2024 0843 by Karin Perez LPN  Infection Prevention:   hand hygiene promoted   rest/sleep promoted   single patient room provided  Goal: Optimal Comfort and Wellbeing  Outcome: Progressing  Intervention: Provide Person-Centered Care  Recent Flowsheet Documentation  Taken 11/9/2024 0843 by Karin Perez LPN  Trust Relationship/Rapport: care explained  Goal: Readiness for Transition of Care  Outcome: Progressing     Problem: Fall Injury Risk  Goal: Absence of Fall and Fall-Related Injury  Outcome: Progressing  Intervention: Identify and Manage Contributors  Recent Flowsheet Documentation  Taken 11/9/2024 1429 by Karin Perez LPN  Medication Review/Management: medications reviewed  Taken 11/9/2024 1222 by Karin Perez LPN  Medication Review/Management: medications reviewed  Taken 11/9/2024 0843 by Karin Perez LPN  Medication Review/Management: medications reviewed  Self-Care Promotion: independence encouraged  Intervention: Promote Injury-Free Environment  Recent Flowsheet Documentation  Taken 11/9/2024 1429 by Karin Perez LPN  Safety Promotion/Fall Prevention:   activity supervised   assistive device/personal items within reach   clutter free environment maintained   lighting adjusted   nonskid shoes/slippers when out of bed   room organization consistent   safety round/check completed  Taken 11/9/2024 1222 by Karin Perez  LPN  Safety Promotion/Fall Prevention:   activity supervised   assistive device/personal items within reach   clutter free environment maintained   lighting adjusted   nonskid shoes/slippers when out of bed   room organization consistent   safety round/check completed  Taken 11/9/2024 1022 by Karin Perez LPN  Safety Promotion/Fall Prevention:   activity supervised   assistive device/personal items within reach   clutter free environment maintained   lighting adjusted   nonskid shoes/slippers when out of bed   room organization consistent   safety round/check completed  Taken 11/9/2024 0843 by Karin Perez LPN  Safety Promotion/Fall Prevention:   activity supervised   assistive device/personal items within reach   clutter free environment maintained   lighting adjusted   nonskid shoes/slippers when out of bed   room organization consistent   safety round/check completed     Problem: Skin Injury Risk Increased  Goal: Skin Health and Integrity  Outcome: Progressing  Intervention: Optimize Skin Protection  Recent Flowsheet Documentation  Taken 11/9/2024 0843 by Karin Perez LPN  Activity Management: up in chair  Pressure Reduction Techniques: frequent weight shift encouraged  Head of Bed (HOB) Positioning: HOB at 20-30 degrees  Pressure Reduction Devices:   alternating pressure pump (CHRISTY)   positioning supports utilized  Skin Protection: incontinence pads utilized     Problem: Comorbidity Management  Goal: Blood Pressure in Desired Range  Outcome: Progressing  Intervention: Maintain Blood Pressure Management  Recent Flowsheet Documentation  Taken 11/9/2024 1429 by Karin Perez LPN  Medication Review/Management: medications reviewed  Taken 11/9/2024 1222 by Karin Perez LPN  Medication Review/Management: medications reviewed  Taken 11/9/2024 0843 by Karin Perez LPN  Medication Review/Management: medications reviewed   Goal Outcome Evaluation:  Plan of Care Reviewed With: patient, child        Progress: no change

## 2024-11-10 LAB
ALBUMIN SERPL-MCNC: 3.8 G/DL (ref 3.5–5.2)
ALBUMIN/GLOB SERPL: 1.6 G/DL
ALP SERPL-CCNC: 41 U/L (ref 39–117)
ALT SERPL W P-5'-P-CCNC: 15 U/L (ref 1–33)
ANION GAP SERPL CALCULATED.3IONS-SCNC: 11.3 MMOL/L (ref 5–15)
AST SERPL-CCNC: 28 U/L (ref 1–32)
BASOPHILS # BLD AUTO: 0.03 10*3/MM3 (ref 0–0.2)
BASOPHILS NFR BLD AUTO: 0.6 % (ref 0–1.5)
BILIRUB SERPL-MCNC: 0.3 MG/DL (ref 0–1.2)
BUN SERPL-MCNC: 16 MG/DL (ref 8–23)
BUN/CREAT SERPL: 14.2 (ref 7–25)
CALCIUM SPEC-SCNC: 9 MG/DL (ref 8.6–10.5)
CHLORIDE SERPL-SCNC: 98 MMOL/L (ref 98–107)
CO2 SERPL-SCNC: 24.7 MMOL/L (ref 22–29)
CREAT SERPL-MCNC: 1.13 MG/DL (ref 0.57–1)
DEPRECATED RDW RBC AUTO: 41.8 FL (ref 37–54)
EGFRCR SERPLBLD CKD-EPI 2021: 46.6 ML/MIN/1.73
EOSINOPHIL # BLD AUTO: 0.26 10*3/MM3 (ref 0–0.4)
EOSINOPHIL NFR BLD AUTO: 5.1 % (ref 0.3–6.2)
ERYTHROCYTE [DISTWIDTH] IN BLOOD BY AUTOMATED COUNT: 13.3 % (ref 12.3–15.4)
GLOBULIN UR ELPH-MCNC: 2.4 GM/DL
GLUCOSE SERPL-MCNC: 90 MG/DL (ref 65–99)
HCT VFR BLD AUTO: 31.2 % (ref 34–46.6)
HGB BLD-MCNC: 10 G/DL (ref 12–15.9)
IMM GRANULOCYTES # BLD AUTO: 0.02 10*3/MM3 (ref 0–0.05)
IMM GRANULOCYTES NFR BLD AUTO: 0.4 % (ref 0–0.5)
LYMPHOCYTES # BLD AUTO: 0.91 10*3/MM3 (ref 0.7–3.1)
LYMPHOCYTES NFR BLD AUTO: 17.9 % (ref 19.6–45.3)
MAGNESIUM SERPL-MCNC: 1.8 MG/DL (ref 1.6–2.4)
MCH RBC QN AUTO: 27.6 PG (ref 26.6–33)
MCHC RBC AUTO-ENTMCNC: 32.1 G/DL (ref 31.5–35.7)
MCV RBC AUTO: 86.2 FL (ref 79–97)
MONOCYTES # BLD AUTO: 0.51 10*3/MM3 (ref 0.1–0.9)
MONOCYTES NFR BLD AUTO: 10 % (ref 5–12)
NEUTROPHILS NFR BLD AUTO: 3.35 10*3/MM3 (ref 1.7–7)
NEUTROPHILS NFR BLD AUTO: 66 % (ref 42.7–76)
NRBC BLD AUTO-RTO: 0 /100 WBC (ref 0–0.2)
PHOSPHATE SERPL-MCNC: 4.2 MG/DL (ref 2.5–4.5)
PLATELET # BLD AUTO: 224 10*3/MM3 (ref 140–450)
PMV BLD AUTO: 9.5 FL (ref 6–12)
POTASSIUM SERPL-SCNC: 4.1 MMOL/L (ref 3.5–5.2)
PROT SERPL-MCNC: 6.2 G/DL (ref 6–8.5)
RBC # BLD AUTO: 3.62 10*6/MM3 (ref 3.77–5.28)
SODIUM SERPL-SCNC: 134 MMOL/L (ref 136–145)
WBC NRBC COR # BLD AUTO: 5.08 10*3/MM3 (ref 3.4–10.8)

## 2024-11-10 PROCEDURE — 85025 COMPLETE CBC W/AUTO DIFF WBC: CPT | Performed by: STUDENT IN AN ORGANIZED HEALTH CARE EDUCATION/TRAINING PROGRAM

## 2024-11-10 PROCEDURE — 25010000002 MAGNESIUM SULFATE 2 GM/50ML SOLUTION: Performed by: STUDENT IN AN ORGANIZED HEALTH CARE EDUCATION/TRAINING PROGRAM

## 2024-11-10 PROCEDURE — 25010000002 ENOXAPARIN PER 10 MG

## 2024-11-10 PROCEDURE — 99232 SBSQ HOSP IP/OBS MODERATE 35: CPT | Performed by: INTERNAL MEDICINE

## 2024-11-10 PROCEDURE — 97530 THERAPEUTIC ACTIVITIES: CPT

## 2024-11-10 PROCEDURE — 83735 ASSAY OF MAGNESIUM: CPT | Performed by: STUDENT IN AN ORGANIZED HEALTH CARE EDUCATION/TRAINING PROGRAM

## 2024-11-10 PROCEDURE — 97112 NEUROMUSCULAR REEDUCATION: CPT

## 2024-11-10 PROCEDURE — 80053 COMPREHEN METABOLIC PANEL: CPT | Performed by: STUDENT IN AN ORGANIZED HEALTH CARE EDUCATION/TRAINING PROGRAM

## 2024-11-10 PROCEDURE — 84100 ASSAY OF PHOSPHORUS: CPT | Performed by: STUDENT IN AN ORGANIZED HEALTH CARE EDUCATION/TRAINING PROGRAM

## 2024-11-10 RX ORDER — MAGNESIUM SULFATE HEPTAHYDRATE 40 MG/ML
2 INJECTION, SOLUTION INTRAVENOUS ONCE
Status: COMPLETED | OUTPATIENT
Start: 2024-11-10 | End: 2024-11-10

## 2024-11-10 RX ADMIN — SENNOSIDES AND DOCUSATE SODIUM 2 TABLET: 50; 8.6 TABLET ORAL at 21:22

## 2024-11-10 RX ADMIN — MIDODRINE HYDROCHLORIDE 5 MG: 5 TABLET ORAL at 07:28

## 2024-11-10 RX ADMIN — CYCLOBENZAPRINE 10 MG: 10 TABLET, FILM COATED ORAL at 07:28

## 2024-11-10 RX ADMIN — MIDODRINE HYDROCHLORIDE 5 MG: 5 TABLET ORAL at 11:10

## 2024-11-10 RX ADMIN — CYCLOBENZAPRINE 10 MG: 10 TABLET, FILM COATED ORAL at 16:50

## 2024-11-10 RX ADMIN — PENTOXIFYLLINE 400 MG: 400 TABLET, EXTENDED RELEASE ORAL at 07:28

## 2024-11-10 RX ADMIN — Medication 12.5 MG: at 07:28

## 2024-11-10 RX ADMIN — MAGNESIUM SULFATE HEPTAHYDRATE 2 G: 40 INJECTION, SOLUTION INTRAVENOUS at 19:29

## 2024-11-10 RX ADMIN — LATANOPROST 1 DROP: 50 SOLUTION/ DROPS OPHTHALMIC at 21:22

## 2024-11-10 RX ADMIN — Medication 10 ML: at 21:40

## 2024-11-10 RX ADMIN — MIRTAZAPINE 15 MG: 15 TABLET, FILM COATED ORAL at 21:22

## 2024-11-10 RX ADMIN — CYCLOBENZAPRINE 10 MG: 10 TABLET, FILM COATED ORAL at 21:22

## 2024-11-10 RX ADMIN — LIDOCAINE 1 PATCH: 4 PATCH TOPICAL at 21:23

## 2024-11-10 RX ADMIN — Medication 10 ML: at 07:28

## 2024-11-10 RX ADMIN — ENOXAPARIN SODIUM 30 MG: 100 INJECTION SUBCUTANEOUS at 16:50

## 2024-11-10 RX ADMIN — SENNOSIDES AND DOCUSATE SODIUM 2 TABLET: 50; 8.6 TABLET ORAL at 07:29

## 2024-11-10 RX ADMIN — PENTOXIFYLLINE 400 MG: 400 TABLET, EXTENDED RELEASE ORAL at 21:22

## 2024-11-10 RX ADMIN — MIDODRINE HYDROCHLORIDE 5 MG: 5 TABLET ORAL at 16:50

## 2024-11-10 RX ADMIN — Medication 12.5 MG: at 16:51

## 2024-11-10 RX ADMIN — FUROSEMIDE 20 MG: 20 TABLET ORAL at 07:29

## 2024-11-10 RX ADMIN — TRAMADOL HYDROCHLORIDE 50 MG: 50 TABLET, COATED ORAL at 18:13

## 2024-11-10 RX ADMIN — PRAMIPEXOLE DIHYDROCHLORIDE 1 MG: 1 TABLET ORAL at 21:21

## 2024-11-10 NOTE — PLAN OF CARE
Assessment: Juany Dalton presents with functional mobility impairments which indicate the need for skilled intervention. Tolerating session today without incident. Pt very guarded and fearful, knew she wasn't thinking straight this morning. Transferred bed>bs commode>recliner with max assist from front. Plans on rehab at [place].Will continue to follow and progress as tolerated.

## 2024-11-10 NOTE — PROGRESS NOTES
Hematology/Oncology Inpatient Progress Note    PATIENT NAME: Juany Dalton  : 1934  MRN: [id number]    CHIEF COMPLAINT: Possible metastatic malignancy.     HISTORY OF PRESENT ILLNESS:      2024: I was asked to see Ms. Dalton. She was brought to the hospital by her family. For a few days she had been having more dyspnea and edema of the lower extremities. In the process of investigating the cause of her symptoms she had a CT scan of the chest. It reported a locally invasive right chest wall tumor involving the costal cartilage and intercostal spaces, centered at the anterior fifth to seventh ribs. This was felt to potentially correspond to a malignant process. A hypodense lesion in the pancreatic head was present, as well. Later a MRI of the abdomen was performed and indeed an ill defined tumor of the pancreatic head was documented. It measured 1.4 cm and was felt to potentially correspond to a primary pancreatic malignancy. However, a metastatic lesion could not be ruled out either. She admitted to enlargement of a nodule on the anterior chest wall that was fist noted in 2024. At first this was felt to be an inflammatory process. However when it did not improve she was referred for a scan that had to be postponed because of the admission to the hospital. She admitted to weight loss and profound anorexia. She had not experienced any abdominal pain or nausea. She had been defecating regularly and had not had melena or hematochezia. No edema. No skin rash. On exam alert, ill appearing and in no distress. No jaundice but pale. Well hydrated. Lungs clear and heart regular. Indeed there is a tumor easily palpable on the anterior right chest, involving the most medial portion of the inferior breast. The breast, however, is pendulous and without dimpling or ulceration of the skin. No nipple discharge. Abdomen soft. There is some lower extremity edema. The images of the chest are highly  suggestive of malignancy. The source could be the pancreas but it is impossible to rule out other possibilities including a right breast cancer. A biopsy is essential for diagnosis and to determine the source. While at this point no plans for treatment have been made, we have agreed on a biopsy. Discussed the possible sources of this apparent malignancy. Explained to her and her family that this by no means would be a curable disease and the role of palliative and symptomatic treatment could have. Radiation for palliation of pain was also explained. Will try to obtain a biopsy of the anterior chest tumor.     Subjective   11/10/2024: Feels about the same. Upset about the timing of today's physical therapy.     ROS:  Review of Systems   Constitutional:  Positive for activity change. Negative for appetite change, chills, diaphoresis, fatigue, fever and unexpected weight change.   HENT:  Negative for congestion, dental problem, drooling, ear discharge, ear pain, facial swelling, hearing loss, mouth sores, nosebleeds, postnasal drip, rhinorrhea, sinus pressure, sinus pain, sneezing, sore throat, tinnitus, trouble swallowing and voice change.    Eyes:  Negative for photophobia, pain, discharge, redness, itching and visual disturbance.   Respiratory:  Negative for apnea, cough, choking, chest tightness, shortness of breath, wheezing and stridor.    Cardiovascular:  Negative for chest pain, palpitations and leg swelling.   Gastrointestinal:  Negative for abdominal distention, abdominal pain, anal bleeding, blood in stool, constipation, diarrhea, nausea, rectal pain and vomiting.   Endocrine: Negative for cold intolerance, heat intolerance, polydipsia and polyuria.   Genitourinary:  Negative for decreased urine volume, difficulty urinating, dysuria, flank pain, frequency, genital sores, hematuria and urgency.   Musculoskeletal:  Negative for arthralgias, back pain, gait problem, joint swelling, myalgias, neck pain and neck  "stiffness.   Skin:  Negative for color change, pallor and rash.   Neurological:  Positive for weakness. Negative for dizziness, tremors, seizures, syncope, facial asymmetry, speech difficulty, light-headedness, numbness and headaches.   Hematological:  Negative for adenopathy. Does not bruise/bleed easily.   Psychiatric/Behavioral:  Negative for agitation, behavioral problems, confusion, decreased concentration, hallucinations, self-injury, sleep disturbance and suicidal ideas. The patient is not nervous/anxious.         MEDICATIONS:    Scheduled Meds:  cyclobenzaprine, 10 mg, Oral, TID  enoxaparin, 30 mg, Subcutaneous, Q24H  furosemide, 20 mg, Oral, Daily  latanoprost, 1 drop, Both Eyes, Nightly  Lidocaine, 1 patch, Transdermal, Nightly  metoprolol tartrate, 12.5 mg, Oral, BID With Meals  midodrine, 5 mg, Oral, TID AC  mirtazapine, 15 mg, Oral, Nightly  pentoxifylline, 400 mg, Oral, BID  pramipexole, 1 mg, Oral, Nightly  senna-docusate sodium, 2 tablet, Oral, BID  sodium chloride, 10 mL, Intravenous, Q12H       Continuous Infusions:      PRN Meds:    senna-docusate sodium **AND** polyethylene glycol **AND** bisacodyl **AND** bisacodyl    Calcium Replacement - Follow Nurse / BPA Driven Protocol    dicyclomine    HYDROmorphone    HYDROmorphone **AND** naloxone    Magnesium Standard Dose Replacement - Follow Nurse / BPA Driven Protocol    nitroglycerin    Phosphorus Replacement - Follow Nurse / BPA Driven Protocol    Potassium Replacement - Follow Nurse / BPA Driven Protocol    sodium chloride    sodium chloride    sodium chloride    sodium chloride    traMADol     ALLERGIES:    Allergies   Allergen Reactions    Morphine Hives and Rash    Ampicillin Hives       Objective    VITALS:   /59 (BP Location: Left arm, Patient Position: Lying)   Pulse 84   Temp 98 °F (36.7 °C) (Oral)   Resp 17   Ht 152.4 cm (60\")   Wt 58.1 kg (128 lb)   SpO2 96%   BMI 25.00 kg/m²     PHYSICAL EXAM: (performed by MD)  Physical " Exam  Constitutional:       General: She is not in acute distress.     Appearance: She is ill-appearing. She is not toxic-appearing or diaphoretic.   HENT:      Head: Normocephalic and atraumatic.      Right Ear: External ear normal.      Left Ear: External ear normal.      Nose: Nose normal.      Mouth/Throat:      Mouth: Mucous membranes are moist.      Pharynx: Oropharynx is clear. No oropharyngeal exudate or posterior oropharyngeal erythema.   Eyes:      General: No scleral icterus.        Right eye: No discharge.         Left eye: No discharge.      Conjunctiva/sclera: Conjunctivae normal.      Pupils: Pupils are equal, round, and reactive to light.   Cardiovascular:      Rate and Rhythm: Normal rate and regular rhythm.      Pulses: Normal pulses.      Heart sounds: No murmur heard.     No friction rub. No gallop.   Pulmonary:      Effort: No respiratory distress.      Breath sounds: No stridor. No wheezing, rhonchi or rales.   Abdominal:      General: Abdomen is flat. Bowel sounds are normal. There is no distension.      Palpations: Abdomen is soft. There is no mass.      Tenderness: There is no abdominal tenderness. There is no right CVA tenderness, left CVA tenderness, guarding or rebound.      Hernia: No hernia is present.   Musculoskeletal:         General: No tenderness, deformity or signs of injury.      Cervical back: No rigidity.      Right lower leg: Edema present.      Left lower leg: Edema present.   Lymphadenopathy:      Cervical: No cervical adenopathy.   Skin:     Coloration: Skin is not jaundiced or pale.      Findings: No bruising, lesion or rash.   Neurological:      General: No focal deficit present.      Mental Status: She is alert and oriented to person, place, and time.      Cranial Nerves: No cranial nerve deficit.   Psychiatric:         Mood and Affect: Mood normal.         Behavior: Behavior normal.         Thought Content: Thought content normal.         Judgment: Judgment normal.      ONEL Villagran MD performed the physical exam on 11/10/2024 as documented above.      RECENT LABS:  Lab Results (last 24 hours)       Procedure Component Value Units Date/Time    Magnesium [[id number]]  (Normal) Collected: 11/10/24 0419    Specimen: Blood Updated: 11/10/24 0608     Magnesium 1.8 mg/dL     Comprehensive Metabolic Panel [[id number]]  (Abnormal) Collected: 11/10/24 0419    Specimen: Blood Updated: 11/10/24 0608     Glucose 90 mg/dL      BUN 16 mg/dL      Creatinine 1.13 mg/dL      Sodium 134 mmol/L      Potassium 4.1 mmol/L      Chloride 98 mmol/L      CO2 24.7 mmol/L      Calcium 9.0 mg/dL      Total Protein 6.2 g/dL      Albumin 3.8 g/dL      ALT (SGPT) 15 U/L      AST (SGOT) 28 U/L      Alkaline Phosphatase 41 U/L      Total Bilirubin 0.3 mg/dL      Globulin 2.4 gm/dL      A/G Ratio 1.6 g/dL      BUN/Creatinine Ratio 14.2     Anion Gap 11.3 mmol/L      eGFR 46.6 mL/min/1.73     Narrative:      GFR Normal >60  Chronic Kidney Disease <60  Kidney Failure <15    The GFR formula is only valid for adults with stable renal function between ages 18 and 70.    Phosphorus [[id number]]  (Normal) Collected: 11/10/24 0419    Specimen: Blood Updated: 11/10/24 0608     Phosphorus 4.2 mg/dL     CBC & Differential [[id number]]  (Abnormal) Collected: 11/10/24 0419    Specimen: Blood Updated: 11/10/24 0537    Narrative:      The following orders were created for panel order CBC & Differential.  Procedure                               Abnormality         Status                     ---------                               -----------         ------                     CBC Auto Differential[[id number]]        Abnormal            Final result                 Please view results for these tests on the individual orders.    CBC Auto Differential [[id number]]  (Abnormal) Collected: 11/10/24 0419    Specimen: Blood Updated: 11/10/24 0537     WBC 5.08 10*3/mm3      RBC 3.62 10*6/mm3      Hemoglobin 10.0 g/dL      Hematocrit  31.2 %      MCV 86.2 fL      MCH 27.6 pg      MCHC 32.1 g/dL      RDW 13.3 %      RDW-SD 41.8 fl      MPV 9.5 fL      Platelets 224 10*3/mm3      Neutrophil % 66.0 %      Lymphocyte % 17.9 %      Monocyte % 10.0 %      Eosinophil % 5.1 %      Basophil % 0.6 %      Immature Grans % 0.4 %      Neutrophils, Absolute 3.35 10*3/mm3      Lymphocytes, Absolute 0.91 10*3/mm3      Monocytes, Absolute 0.51 10*3/mm3      Eosinophils, Absolute 0.26 10*3/mm3      Basophils, Absolute 0.03 10*3/mm3      Immature Grans, Absolute 0.02 10*3/mm3      nRBC 0.0 /100 WBC           IMAGING REVIEWED:  MRI Abdomen Without Contrast    Result Date: 11/8/2024  Impression: 1. Multiple exam limitations as above. 2. Ill-defined mass in the pancreatic head measuring 1.4 cm potentially representing a primary malignancy (pancreatic adenocarcinoma). Metastatic disease from other primary is possible. Consider correlation with EUS/FNA. 3. Infiltrative right anterior chest wall and left posterolateral chest wall masses highly suspicious for malignancy, probably metastatic. 1.9 cm nodule in the right retroperitoneum appearing similar by T2 weighted and diffusion weighted sequences suspicious for metastatic disease. Differential includes lymphoma. 4. Cholelithiasis. No findings to suggest acute cholecystitis or biliary obstruction. 5. Body wall edema. Electronically Signed: Carlos Flores MD  11/8/2024 10:08 PM EST  Workstation ID: [id number]     Assessment & Plan   ASSESSMENT:  Apparent metastatic malignancy: To undergo biopsy of the anterior chest tumor.   Peripheral edema: improving.     PLAN:  As above.     Genaro Villagran MD on 11/10/2024 at 11:43 PM.

## 2024-11-10 NOTE — PROGRESS NOTES
[facility] MEDICINE SERVICE  DAILY PROGRESS NOTE    NAME: Juany Dalton  : 1934  MRN: [id number]      LOS: 2 days     PROVIDER OF SERVICE: Henry Dobson MD    Chief Complaint: Shortness of breath    Subjective:     Interval History:  History taken from: patient  No acute overnight events. Patient stated feeling better today, cramps improving, however daughter at bedside stated cramps not improved, family was reassured we will check her Mg, otherwise no new complains, pending biopsy.    Review of Systems:   Review of Systems    Objective:     Vital Signs  Temp:  [97.2 °F (36.2 °C)-98.1 °F (36.7 °C)] 98 °F (36.7 °C)  Heart Rate:  [66-84] 84  Resp:  [17-22] 17  BP: (116-174)/(49-73) 135/59   Body mass index is 25 kg/m².    Physical Exam  General Appearance:  Awake alert   Head:  Atraumatic normocephalic   Eyes:        No scleral icterus   Neck: Normal range of motion   Pulm: Clear to auscultation, no wheezes noted   Cardio: Heart rate normal, regular rhythm   Extremities: Edema present on the bilateral extremities slight redness noted   Abdomen: Soft nontender         Musculoskeletal: Moves all extremities   Neuro: Awake, alert, conversational, normal speech                 Scheduled Meds   cyclobenzaprine, 10 mg, Oral, TID  enoxaparin, 30 mg, Subcutaneous, Q24H  furosemide, 20 mg, Oral, Daily  latanoprost, 1 drop, Both Eyes, Nightly  Lidocaine, 1 patch, Transdermal, Nightly  metoprolol tartrate, 12.5 mg, Oral, BID With Meals  midodrine, 5 mg, Oral, TID AC  mirtazapine, 15 mg, Oral, Nightly  pentoxifylline, 400 mg, Oral, BID  pramipexole, 1 mg, Oral, Nightly  senna-docusate sodium, 2 tablet, Oral, BID  sodium chloride, 10 mL, Intravenous, Q12H       PRN Meds     senna-docusate sodium **AND** polyethylene glycol **AND** bisacodyl **AND** bisacodyl    Calcium Replacement - Follow Nurse / BPA Driven Protocol    dicyclomine    HYDROmorphone    HYDROmorphone **AND** naloxone    Magnesium Standard  Dose Replacement - Follow Nurse / BPA Driven Protocol    nitroglycerin    Phosphorus Replacement - Follow Nurse / BPA Driven Protocol    Potassium Replacement - Follow Nurse / BPA Driven Protocol    sodium chloride    sodium chloride    sodium chloride    sodium chloride    traMADol   Infusions         Diagnostic Data    Results from last 7 days   Lab Units 11/10/24  0419   WBC 10*3/mm3 5.08   HEMOGLOBIN g/dL 10.0*   HEMATOCRIT % 31.2*   PLATELETS 10*3/mm3 224   GLUCOSE mg/dL 90   CREATININE mg/dL 1.13*   BUN mg/dL 16   SODIUM mmol/L 134*   POTASSIUM mmol/L 4.1   AST (SGOT) U/L 28   ALT (SGPT) U/L 15   ALK PHOS U/L 41   BILIRUBIN mg/dL 0.3   ANION GAP mmol/L 11.3       MRI Abdomen Without Contrast    Result Date: 11/8/2024  Impression: 1. Multiple exam limitations as above. 2. Ill-defined mass in the pancreatic head measuring 1.4 cm potentially representing a primary malignancy (pancreatic adenocarcinoma). Metastatic disease from other primary is possible. Consider correlation with EUS/FNA. 3. Infiltrative right anterior chest wall and left posterolateral chest wall masses highly suspicious for malignancy, probably metastatic. 1.9 cm nodule in the right retroperitoneum appearing similar by T2 weighted and diffusion weighted sequences suspicious for metastatic disease. Differential includes lymphoma. 4. Cholelithiasis. No findings to suggest acute cholecystitis or biliary obstruction. 5. Body wall edema. Electronically Signed: Carlos Flores MD  11/8/2024 10:08 PM EST  Workstation ID: [id number]       I reviewed the patient's new clinical results.    Assessment/Plan:   Juany Dalton is a 89 y.o. female with a CMH of chronic back pain, osteoporosis, arthritis, history of left breast lumpectomy (in the 70s) that was benign, lower extremity lymphedema, growing chest mass x 3 months who presented to [facility] on 11/7/2024 with shortness of breath and bilateral lower extremity edema progressively gotten worse in  the past week.  His night sweats, weight loss, cough, chest pain, nausea vomiting or diarrhea. Of note, patient noticed a mid chest mass in August that her PCP suspected to be a bony protrusion.  She saw her PCP in the past week for a UTI and her PCP ordered a chest CT due to increased growth of the mass.     Assessments:  #Dyspnea with bilateral lower extremities edema: Rule out CHF  #Right chest wall mass involving 5th through 7th ribs, Measuring 3.1 x 4.2 x 10 cm   #Status post biopsy in 2021 left breast, by Dr. Gallegos, pathology results were benign   #hypodense lesion in the pancreatic head, measuring 1.4 cm, MRI pending for further eval  # ANIBAL, previous baseline creatinine was around 1.05s  #Cholelithiasis     Plan:  -Oncology consulted, MRI pending pancreatic head lesion, will consult GI pending MRI results  -She had a biopsy in 2021 pathology showing benign results, discussed with patient daughter, she could remember  -Will start on Lasix 20 daily given her soft BP, monitor strict ins and outs, echo is pending will follow  -Continue home meds will follow  -VTE prophylaxis  -AM labs will follow     11/9:   -Oncology reccs noted   -BP improved with Fluids yesterday, will back off midodrine to 5 TID from 10, if BP remain stable, nikolay start PO lasix roni  -Pramipexole increased  -On DVT ppx, am labs  -On going work up    11/10:   -For BLE cramps: Negative for DVT on Duplex venous, Mg 1.8, will give IV mg today for cramps, although improving, pramipexole incrase, Cr improved to 1.13   -Start po lasix 20 daily, Monitor strict I/Os, Echo: LVEF 60 %, G1DD, RVSP 35-45 mm hg, mildy elevated  -Oncology reccs noted, plan for biopsy tomorrow  -AM Labs    VTE Prophylaxis:  Pharmacologic VTE prophylaxis orders are present.         Code status is   Code Status and Medical Interventions: CPR (Attempt to Resuscitate); Full Support   Ordered at: 11/07/24 1957     Code Status (Patient has no pulse and is not breathing):    CPR  (Attempt to Resuscitate)     Medical Interventions (Patient has pulse or is breathing):    Full Support       Plan for disposition:Oncology work up for mass    Time: 30 minutes    Part of this note may be an electronic transcription/translation of spoken language to printed text using the Dragon Dictation System.    Signature: Electronically signed by Henry Dobson MD, 11/10/24, 11:49 EST.  [facility] Hospitalist Team

## 2024-11-10 NOTE — THERAPY TREATMENT NOTE
"Subjective: Pt agreeable to therapeutic plan of care. Pt very anxious and could not get comfortable due to RLE RLS.C/o feeling foggy and knew she  was confused. Was fearful of falling.    Objective:     Precautions - falls    Bed mobility - Max-A  Transfers - Max-A x1 and assist x1 with brief  Ambulation -  feet N/A or Not attempted.    Vitals: WNL    Pain:  c/o R RLS pain and could not get calmed down until she got in recliner  Intervention for pain: Repositioned, RN notified, Increased Activity, and Therapeutic Presence    Education: Provided education on the importance of mobility in the acute care setting, Verbal/Tactile Cues, ADL training, and Transfer Training    Assessment: Juany Dalton presents with functional mobility impairments which indicate the need for skilled intervention. Tolerating session today without incident. Pt very guarded and fearful, knew she wasn't thinking straight this morning. Transferred bed>bs commode>recliner with max assist from front. Plans on rehab at [place].Will continue to follow and progress as tolerated.     Plan/Recommendations:   If medically appropriate, Moderate Intensity Therapy recommended post-acute care. This is recommended as therapy feels the patient would require 3-4 days per week and wouldn't tolerate \"3 hour daily\" rehab intensity. SNF would be the preferred choice. If the patient does not agree to SNF, arrange HH or OP depending on home bound status. If patient is medically complex, consider LTACH. Pt requires no DME at discharge.     Pt desires Skilled Rehab placement at discharge. Pt cooperative; agreeable to therapeutic recommendations and plan of care.         Basic Mobility 6-click:  Rollin = Total, A lot = 2, A little = 3; 4 = None  Supine>Sit:   1 = Total, A lot = 2, A little = 3; 4 = None   Sit>Stand with arms:  1 = Total, A lot = 2, A little = 3; 4 = None  Bed>Chair:   1 = Total, A lot = 2, A little = 3; 4 = None  Ambulate in room:  1 = Total, A " lot = 2, A little = 3; 4 = None  3-5 Steps with railin = Total, A lot = 2, A little = 3; 4 = None  Score: 10    Post-Tx Position: Up in Chair, Alarms activated, and Call light and personal items within reach  PPE: gloves    Therapy Charges for Today       Code Description Service Date Service Provider Modifiers Qty    99016450666  PT NEUROMUSC RE EDUCATION EA 15 MIN 11/10/2024 Meme Granda PTA GP 2    [id number]  PT THERAPEUTIC ACT EA 15 MIN 11/10/2024 Meme Granda PTA GP 2           PT Charges       Row Name 11/10/24 1657             Time Calculation    Start Time 0720  -      Stop Time 0810  -      Time Calculation (min) 50 min  -      PT Received On 11/10/24  -      PT - Next Appointment 24  -         Time Calculation- PT    Total Timed Code Minutes- PT 50 minute(s)  -                User Key  (r) = Recorded By, (t) = Taken By, (c) = Cosigned By      Initials Name Provider Type     Meme Granda PTA Physical Therapist Assistant

## 2024-11-10 NOTE — PLAN OF CARE
Problem: Adult Inpatient Plan of Care  Goal: Plan of Care Review  Outcome: Progressing  Flowsheets (Taken 11/10/2024 1337)  Progress: no change  Plan of Care Reviewed With:   patient   child  Goal: Patient-Specific Goal (Individualized)  Outcome: Progressing  Goal: Absence of Hospital-Acquired Illness or Injury  Outcome: Progressing  Intervention: Identify and Manage Fall Risk  Recent Flowsheet Documentation  Taken 11/10/2024 1209 by Karin Perez LPN  Safety Promotion/Fall Prevention:   activity supervised   assistive device/personal items within reach   clutter free environment maintained   lighting adjusted   nonskid shoes/slippers when out of bed   room organization consistent   safety round/check completed  Taken 11/10/2024 1051 by Karin Perez LPN  Safety Promotion/Fall Prevention:   activity supervised   assistive device/personal items within reach   clutter free environment maintained   lighting adjusted   nonskid shoes/slippers when out of bed   room organization consistent   safety round/check completed  Taken 11/10/2024 0831 by Karin Perez LPN  Safety Promotion/Fall Prevention:   activity supervised   assistive device/personal items within reach   clutter free environment maintained   lighting adjusted   nonskid shoes/slippers when out of bed   room organization consistent   safety round/check completed  Intervention: Prevent Skin Injury  Recent Flowsheet Documentation  Taken 11/10/2024 0831 by Karin Perez LPN  Body Position: (in recliner) other (see comments)  Skin Protection: incontinence pads utilized  Intervention: Prevent and Manage VTE (Venous Thromboembolism) Risk  Recent Flowsheet Documentation  Taken 11/10/2024 0831 by Karin Perez LPN  VTE Prevention/Management: patient refused intervention  Intervention: Prevent Infection  Recent Flowsheet Documentation  Taken 11/10/2024 1209 by Karin Perez LPN  Infection Prevention:   hand hygiene promoted   rest/sleep promoted   single patient room  provided  Taken 11/10/2024 1051 by Karin Perez LPN  Infection Prevention:   hand hygiene promoted   rest/sleep promoted   single patient room provided  Taken 11/10/2024 0831 by Karin Perez LPN  Infection Prevention:   hand hygiene promoted   rest/sleep promoted   single patient room provided  Goal: Optimal Comfort and Wellbeing  Outcome: Progressing  Intervention: Provide Person-Centered Care  Recent Flowsheet Documentation  Taken 11/10/2024 0831 by Karin Perez LPN  Trust Relationship/Rapport: care explained  Goal: Readiness for Transition of Care  Outcome: Progressing     Problem: Fall Injury Risk  Goal: Absence of Fall and Fall-Related Injury  Outcome: Progressing  Intervention: Identify and Manage Contributors  Recent Flowsheet Documentation  Taken 11/10/2024 1051 by Karin Perez LPN  Medication Review/Management: medications reviewed  Taken 11/10/2024 0831 by Karin Perez LPN  Medication Review/Management: medications reviewed  Self-Care Promotion: independence encouraged  Intervention: Promote Injury-Free Environment  Recent Flowsheet Documentation  Taken 11/10/2024 1209 by Karin Perez LPN  Safety Promotion/Fall Prevention:   activity supervised   assistive device/personal items within reach   clutter free environment maintained   lighting adjusted   nonskid shoes/slippers when out of bed   room organization consistent   safety round/check completed  Taken 11/10/2024 1051 by Karin Perez LPN  Safety Promotion/Fall Prevention:   activity supervised   assistive device/personal items within reach   clutter free environment maintained   lighting adjusted   nonskid shoes/slippers when out of bed   room organization consistent   safety round/check completed  Taken 11/10/2024 0831 by Karin Perez LPN  Safety Promotion/Fall Prevention:   activity supervised   assistive device/personal items within reach   clutter free environment maintained   lighting adjusted   nonskid shoes/slippers when out of bed   room organization  consistent   safety round/check completed     Problem: Skin Injury Risk Increased  Goal: Skin Health and Integrity  Outcome: Progressing  Intervention: Optimize Skin Protection  Recent Flowsheet Documentation  Taken 11/10/2024 0831 by Karin Perez LPN  Activity Management: activity encouraged  Pressure Reduction Techniques: frequent weight shift encouraged  Head of Bed (HOB) Positioning: HOB at 20-30 degrees  Pressure Reduction Devices: alternating pressure pump (CHRISTY)  Skin Protection: incontinence pads utilized     Problem: Comorbidity Management  Goal: Blood Pressure in Desired Range  Outcome: Progressing  Intervention: Maintain Blood Pressure Management  Recent Flowsheet Documentation  Taken 11/10/2024 1051 by Karin Perez LPN  Medication Review/Management: medications reviewed  Taken 11/10/2024 0831 by Karin Perez LPN  Medication Review/Management: medications reviewed   Goal Outcome Evaluation:  Plan of Care Reviewed With: patient, child        Progress: no change

## 2024-11-10 NOTE — PLAN OF CARE
Goal Outcome Evaluation:  Plan of Care Reviewed With: patient, family        Progress: no change  Outcome Evaluation: Patient alert and oriented, confused at times. VSS. Complaints of chronic back pain, medicated per MAR orders. Repositioned every 2 hours for comfort and keep pressure off sores. Family at bedside, updated on plan of care.

## 2024-11-10 NOTE — PLAN OF CARE
Goal Outcome Evaluation:  Plan of Care Reviewed With: patient, child        Progress: no change

## 2024-11-11 ENCOUNTER — APPOINTMENT (OUTPATIENT)
Dept: INTERVENTIONAL RADIOLOGY/VASCULAR | Facility: HOSPITAL | Age: 89
DRG: 607 | End: 2024-11-11
Payer: MEDICARE

## 2024-11-11 LAB
ALBUMIN SERPL-MCNC: 3.9 G/DL (ref 3.5–5.2)
ALBUMIN/GLOB SERPL: 1.4 G/DL
ALP SERPL-CCNC: 45 U/L (ref 39–117)
ALT SERPL W P-5'-P-CCNC: 11 U/L (ref 1–33)
ANION GAP SERPL CALCULATED.3IONS-SCNC: 11.4 MMOL/L (ref 5–15)
APTT PPP: 27.5 SECONDS (ref 22.7–35.4)
AST SERPL-CCNC: 25 U/L (ref 1–32)
BASOPHILS # BLD AUTO: 0.05 10*3/MM3 (ref 0–0.2)
BASOPHILS NFR BLD AUTO: 0.8 % (ref 0–1.5)
BILIRUB SERPL-MCNC: 0.3 MG/DL (ref 0–1.2)
BUN SERPL-MCNC: 20 MG/DL (ref 8–23)
BUN/CREAT SERPL: 16.1 (ref 7–25)
CALCIUM SPEC-SCNC: 9.8 MG/DL (ref 8.6–10.5)
CHLORIDE SERPL-SCNC: 97 MMOL/L (ref 98–107)
CO2 SERPL-SCNC: 25.6 MMOL/L (ref 22–29)
CREAT SERPL-MCNC: 1.24 MG/DL (ref 0.57–1)
DEPRECATED RDW RBC AUTO: 43.3 FL (ref 37–54)
EGFRCR SERPLBLD CKD-EPI 2021: 41.7 ML/MIN/1.73
EOSINOPHIL # BLD AUTO: 0.28 10*3/MM3 (ref 0–0.4)
EOSINOPHIL NFR BLD AUTO: 4.7 % (ref 0.3–6.2)
ERYTHROCYTE [DISTWIDTH] IN BLOOD BY AUTOMATED COUNT: 13.4 % (ref 12.3–15.4)
GLOBULIN UR ELPH-MCNC: 2.8 GM/DL
GLUCOSE SERPL-MCNC: 98 MG/DL (ref 65–99)
HCT VFR BLD AUTO: 35.3 % (ref 34–46.6)
HGB BLD-MCNC: 11.1 G/DL (ref 12–15.9)
IMM GRANULOCYTES # BLD AUTO: 0.02 10*3/MM3 (ref 0–0.05)
IMM GRANULOCYTES NFR BLD AUTO: 0.3 % (ref 0–0.5)
INR PPP: 1.15 (ref 0.9–1.1)
LAB AP CASE REPORT: NORMAL
LYMPHOCYTES # BLD AUTO: 1.16 10*3/MM3 (ref 0.7–3.1)
LYMPHOCYTES NFR BLD AUTO: 19.4 % (ref 19.6–45.3)
Lab: NORMAL
MAGNESIUM SERPL-MCNC: 2.2 MG/DL (ref 1.6–2.4)
MCH RBC QN AUTO: 27.5 PG (ref 26.6–33)
MCHC RBC AUTO-ENTMCNC: 31.4 G/DL (ref 31.5–35.7)
MCV RBC AUTO: 87.6 FL (ref 79–97)
MONOCYTES # BLD AUTO: 0.66 10*3/MM3 (ref 0.1–0.9)
MONOCYTES NFR BLD AUTO: 11 % (ref 5–12)
NEUTROPHILS NFR BLD AUTO: 3.81 10*3/MM3 (ref 1.7–7)
NEUTROPHILS NFR BLD AUTO: 63.8 % (ref 42.7–76)
NRBC BLD AUTO-RTO: 0 /100 WBC (ref 0–0.2)
PHOSPHATE SERPL-MCNC: 5.1 MG/DL (ref 2.5–4.5)
PLATELET # BLD AUTO: 232 10*3/MM3 (ref 140–450)
PMV BLD AUTO: 9.1 FL (ref 6–12)
POTASSIUM SERPL-SCNC: 4.4 MMOL/L (ref 3.5–5.2)
PROT SERPL-MCNC: 6.7 G/DL (ref 6–8.5)
PROTHROMBIN TIME: 14.7 SECONDS (ref 11.7–14.2)
RBC # BLD AUTO: 4.03 10*6/MM3 (ref 3.77–5.28)
SODIUM SERPL-SCNC: 134 MMOL/L (ref 136–145)
WBC NRBC COR # BLD AUTO: 5.98 10*3/MM3 (ref 3.4–10.8)

## 2024-11-11 PROCEDURE — 84100 ASSAY OF PHOSPHORUS: CPT | Performed by: STUDENT IN AN ORGANIZED HEALTH CARE EDUCATION/TRAINING PROGRAM

## 2024-11-11 PROCEDURE — 25010000002 LIDOCAINE 1 % SOLUTION: Performed by: RADIOLOGY

## 2024-11-11 PROCEDURE — 88184 FLOWCYTOMETRY/ TC 1 MARKER: CPT

## 2024-11-11 PROCEDURE — 88342 IMHCHEM/IMCYTCHM 1ST ANTB: CPT | Performed by: INTERNAL MEDICINE

## 2024-11-11 PROCEDURE — 97530 THERAPEUTIC ACTIVITIES: CPT

## 2024-11-11 PROCEDURE — 85610 PROTHROMBIN TIME: CPT | Performed by: RADIOLOGY

## 2024-11-11 PROCEDURE — 88305 TISSUE EXAM BY PATHOLOGIST: CPT | Performed by: INTERNAL MEDICINE

## 2024-11-11 PROCEDURE — 88341 IMHCHEM/IMCYTCHM EA ADD ANTB: CPT | Performed by: INTERNAL MEDICINE

## 2024-11-11 PROCEDURE — 85730 THROMBOPLASTIN TIME PARTIAL: CPT | Performed by: RADIOLOGY

## 2024-11-11 PROCEDURE — 88185 FLOWCYTOMETRY/TC ADD-ON: CPT

## 2024-11-11 PROCEDURE — 80053 COMPREHEN METABOLIC PANEL: CPT | Performed by: STUDENT IN AN ORGANIZED HEALTH CARE EDUCATION/TRAINING PROGRAM

## 2024-11-11 PROCEDURE — 97110 THERAPEUTIC EXERCISES: CPT

## 2024-11-11 PROCEDURE — 88365 INSITU HYBRIDIZATION (FISH): CPT

## 2024-11-11 PROCEDURE — 25010000002 HYDROMORPHONE 1 MG/ML SOLUTION

## 2024-11-11 PROCEDURE — 0WB83ZX EXCISION OF CHEST WALL, PERCUTANEOUS APPROACH, DIAGNOSTIC: ICD-10-PCS | Performed by: RADIOLOGY

## 2024-11-11 PROCEDURE — 88333 PATH CONSLTJ SURG CYTO XM 1: CPT | Performed by: INTERNAL MEDICINE

## 2024-11-11 PROCEDURE — 88360 TUMOR IMMUNOHISTOCHEM/MANUAL: CPT

## 2024-11-11 PROCEDURE — 88323 CONSLTJ&REPRT MATRL PREP SLD: CPT

## 2024-11-11 PROCEDURE — 88271 CYTOGENETICS DNA PROBE: CPT

## 2024-11-11 PROCEDURE — 97551 CAREGIVER TRAING EA ADDL 15: CPT

## 2024-11-11 PROCEDURE — 88341 IMHCHEM/IMCYTCHM EA ADD ANTB: CPT

## 2024-11-11 PROCEDURE — 25010000002 FENTANYL CITRATE (PF) 50 MCG/ML SOLUTION: Performed by: RADIOLOGY

## 2024-11-11 PROCEDURE — 76942 ECHO GUIDE FOR BIOPSY: CPT

## 2024-11-11 PROCEDURE — 85025 COMPLETE CBC W/AUTO DIFF WBC: CPT | Performed by: STUDENT IN AN ORGANIZED HEALTH CARE EDUCATION/TRAINING PROGRAM

## 2024-11-11 PROCEDURE — 88342 IMHCHEM/IMCYTCHM 1ST ANTB: CPT

## 2024-11-11 PROCEDURE — 83735 ASSAY OF MAGNESIUM: CPT | Performed by: STUDENT IN AN ORGANIZED HEALTH CARE EDUCATION/TRAINING PROGRAM

## 2024-11-11 PROCEDURE — 25010000002 ENOXAPARIN PER 10 MG

## 2024-11-11 PROCEDURE — 25010000002 ONDANSETRON PER 1 MG: Performed by: RADIOLOGY

## 2024-11-11 RX ORDER — FENTANYL CITRATE 50 UG/ML
INJECTION, SOLUTION INTRAMUSCULAR; INTRAVENOUS AS NEEDED
Status: COMPLETED | OUTPATIENT
Start: 2024-11-11 | End: 2024-11-11

## 2024-11-11 RX ORDER — HYDROCODONE BITARTRATE AND ACETAMINOPHEN 10; 325 MG/1; MG/1
1 TABLET ORAL EVERY 6 HOURS PRN
Status: DISCONTINUED | OUTPATIENT
Start: 2024-11-11 | End: 2024-11-12 | Stop reason: HOSPADM

## 2024-11-11 RX ORDER — METHOCARBAMOL 750 MG/1
750 TABLET, FILM COATED ORAL EVERY 8 HOURS SCHEDULED
Status: DISCONTINUED | OUTPATIENT
Start: 2024-11-11 | End: 2024-11-12 | Stop reason: HOSPADM

## 2024-11-11 RX ORDER — ONDANSETRON 2 MG/ML
INJECTION INTRAMUSCULAR; INTRAVENOUS AS NEEDED
Status: COMPLETED | OUTPATIENT
Start: 2024-11-11 | End: 2024-11-11

## 2024-11-11 RX ORDER — LIDOCAINE HYDROCHLORIDE 10 MG/ML
INJECTION, SOLUTION INFILTRATION; PERINEURAL AS NEEDED
Status: COMPLETED | OUTPATIENT
Start: 2024-11-11 | End: 2024-11-11

## 2024-11-11 RX ADMIN — FUROSEMIDE 20 MG: 20 TABLET ORAL at 07:27

## 2024-11-11 RX ADMIN — METHOCARBAMOL TABLETS 750 MG: 750 TABLET, COATED ORAL at 14:16

## 2024-11-11 RX ADMIN — SENNOSIDES AND DOCUSATE SODIUM 2 TABLET: 50; 8.6 TABLET ORAL at 20:22

## 2024-11-11 RX ADMIN — MIRTAZAPINE 15 MG: 15 TABLET, FILM COATED ORAL at 21:54

## 2024-11-11 RX ADMIN — HYDROMORPHONE HYDROCHLORIDE 0.5 MG: 1 INJECTION, SOLUTION INTRAMUSCULAR; INTRAVENOUS; SUBCUTANEOUS at 06:05

## 2024-11-11 RX ADMIN — LIDOCAINE 1 PATCH: 4 PATCH TOPICAL at 20:27

## 2024-11-11 RX ADMIN — MIDODRINE HYDROCHLORIDE 5 MG: 5 TABLET ORAL at 16:59

## 2024-11-11 RX ADMIN — MIDODRINE HYDROCHLORIDE 5 MG: 5 TABLET ORAL at 07:27

## 2024-11-11 RX ADMIN — METHOCARBAMOL TABLETS 750 MG: 750 TABLET, COATED ORAL at 21:54

## 2024-11-11 RX ADMIN — HYDROCODONE BITARTRATE AND ACETAMINOPHEN 1 TABLET: 10; 325 TABLET ORAL at 17:34

## 2024-11-11 RX ADMIN — PRAMIPEXOLE DIHYDROCHLORIDE 1 MG: 1 TABLET ORAL at 20:22

## 2024-11-11 RX ADMIN — LIDOCAINE HYDROCHLORIDE 5 ML: 10 INJECTION, SOLUTION INFILTRATION; PERINEURAL at 08:38

## 2024-11-11 RX ADMIN — PENTOXIFYLLINE 400 MG: 400 TABLET, EXTENDED RELEASE ORAL at 07:31

## 2024-11-11 RX ADMIN — CYCLOBENZAPRINE 10 MG: 10 TABLET, FILM COATED ORAL at 07:28

## 2024-11-11 RX ADMIN — Medication 12.5 MG: at 16:58

## 2024-11-11 RX ADMIN — Medication 10 ML: at 07:28

## 2024-11-11 RX ADMIN — ONDANSETRON 4 MG: 2 INJECTION INTRAMUSCULAR; INTRAVENOUS at 08:33

## 2024-11-11 RX ADMIN — LATANOPROST 1 DROP: 50 SOLUTION/ DROPS OPHTHALMIC at 20:23

## 2024-11-11 RX ADMIN — PENTOXIFYLLINE 400 MG: 400 TABLET, EXTENDED RELEASE ORAL at 20:21

## 2024-11-11 RX ADMIN — ENOXAPARIN SODIUM 30 MG: 100 INJECTION SUBCUTANEOUS at 16:59

## 2024-11-11 RX ADMIN — FENTANYL CITRATE 50 MCG: 50 INJECTION, SOLUTION INTRAMUSCULAR; INTRAVENOUS at 08:33

## 2024-11-11 RX ADMIN — Medication 12.5 MG: at 07:27

## 2024-11-11 NOTE — PLAN OF CARE
Problem: Adult Inpatient Plan of Care  Goal: Absence of Hospital-Acquired Illness or Injury  Intervention: Identify and Manage Fall Risk  Recent Flowsheet Documentation  Taken 11/11/2024 0400 by Alex Beatty RN  Safety Promotion/Fall Prevention:   safety round/check completed   room organization consistent   nonskid shoes/slippers when out of bed   fall prevention program maintained   clutter free environment maintained   assistive device/personal items within reach  Taken 11/11/2024 0200 by Alex Beatty RN  Safety Promotion/Fall Prevention:   safety round/check completed   room organization consistent   nonskid shoes/slippers when out of bed   fall prevention program maintained   clutter free environment maintained   assistive device/personal items within reach  Taken 11/11/2024 0000 by Alex Beatty RN  Safety Promotion/Fall Prevention:   safety round/check completed   room organization consistent   nonskid shoes/slippers when out of bed   fall prevention program maintained   clutter free environment maintained   assistive device/personal items within reach  Taken 11/10/2024 2200 by Alex Beatty RN  Safety Promotion/Fall Prevention:   safety round/check completed   room organization consistent   nonskid shoes/slippers when out of bed   fall prevention program maintained   clutter free environment maintained   assistive device/personal items within reach  Taken 11/10/2024 2000 by Alex Beatty RN  Safety Promotion/Fall Prevention:   safety round/check completed   room organization consistent   nonskid shoes/slippers when out of bed   fall prevention program maintained   clutter free environment maintained   assistive device/personal items within reach  Intervention: Prevent Skin Injury  Recent Flowsheet Documentation  Taken 11/11/2024 0400 by Alex Beatty RN  Body Position: weight shifting  Skin Protection: incontinence pads utilized  Taken 11/11/2024 0000 by Alex Beatty, CHARLES  Body  Position: left  Skin Protection: incontinence pads utilized  Taken 11/10/2024 2000 by Alex Beatty RN  Body Position: position changed independently  Skin Protection: incontinence pads utilized  Intervention: Prevent and Manage VTE (Venous Thromboembolism) Risk  Recent Flowsheet Documentation  Taken 11/10/2024 2000 by Alex Beatty RN  VTE Prevention/Management: SCDs (sequential compression devices) off  Intervention: Prevent Infection  Recent Flowsheet Documentation  Taken 11/11/2024 0400 by Alex Beatty RN  Infection Prevention:   environmental surveillance performed   hand hygiene promoted   personal protective equipment utilized   rest/sleep promoted   single patient room provided  Taken 11/11/2024 0000 by Alex Beatty RN  Infection Prevention:   environmental surveillance performed   hand hygiene promoted   personal protective equipment utilized   rest/sleep promoted   single patient room provided  Taken 11/10/2024 2000 by Alex Beatty RN  Infection Prevention:   environmental surveillance performed   hand hygiene promoted   personal protective equipment utilized   rest/sleep promoted   single patient room provided  Goal: Optimal Comfort and Wellbeing  Intervention: Provide Person-Centered Care  Recent Flowsheet Documentation  Taken 11/10/2024 2000 by Alex Beatty RN  Trust Relationship/Rapport: care explained     Problem: Fall Injury Risk  Goal: Absence of Fall and Fall-Related Injury  Intervention: Identify and Manage Contributors  Recent Flowsheet Documentation  Taken 11/10/2024 2000 by Alex Beatty RN  Medication Review/Management: medications reviewed  Intervention: Promote Injury-Free Environment  Recent Flowsheet Documentation  Taken 11/11/2024 0400 by Alex Beatty RN  Safety Promotion/Fall Prevention:   safety round/check completed   room organization consistent   nonskid shoes/slippers when out of bed   fall prevention program maintained   clutter free environment  maintained   assistive device/personal items within reach  Taken 11/11/2024 0200 by Alex Beatty RN  Safety Promotion/Fall Prevention:   safety round/check completed   room organization consistent   nonskid shoes/slippers when out of bed   fall prevention program maintained   clutter free environment maintained   assistive device/personal items within reach  Taken 11/11/2024 0000 by Alex Beatty RN  Safety Promotion/Fall Prevention:   safety round/check completed   room organization consistent   nonskid shoes/slippers when out of bed   fall prevention program maintained   clutter free environment maintained   assistive device/personal items within reach  Taken 11/10/2024 2200 by Alex Beatty RN  Safety Promotion/Fall Prevention:   safety round/check completed   room organization consistent   nonskid shoes/slippers when out of bed   fall prevention program maintained   clutter free environment maintained   assistive device/personal items within reach  Taken 11/10/2024 2000 by Alex Beatty RN  Safety Promotion/Fall Prevention:   safety round/check completed   room organization consistent   nonskid shoes/slippers when out of bed   fall prevention program maintained   clutter free environment maintained   assistive device/personal items within reach     Problem: Skin Injury Risk Increased  Goal: Skin Health and Integrity  Intervention: Optimize Skin Protection  Recent Flowsheet Documentation  Taken 11/11/2024 0400 by Alex Beatty RN  Pressure Reduction Techniques:   heels elevated off bed   frequent weight shift encouraged   weight shift assistance provided  Head of Bed (HOB) Positioning: HOB elevated  Pressure Reduction Devices: pressure-redistributing mattress utilized  Skin Protection: incontinence pads utilized  Taken 11/11/2024 0000 by Alex Beatty RN  Pressure Reduction Techniques: frequent weight shift encouraged  Head of Bed (HOB) Positioning: HOB elevated  Pressure Reduction  Devices: pressure-redistributing mattress utilized  Skin Protection: incontinence pads utilized  Taken 11/10/2024 2000 by Alex Beatty RN  Pressure Reduction Techniques: frequent weight shift encouraged  Head of Bed (HOB) Positioning: HOB elevated  Pressure Reduction Devices: pressure-redistributing mattress utilized  Skin Protection: incontinence pads utilized     Problem: Comorbidity Management  Goal: Blood Pressure in Desired Range  Intervention: Maintain Blood Pressure Management  Recent Flowsheet Documentation  Taken 11/10/2024 2000 by Alex Beatty RN  Medication Review/Management: medications reviewed   Goal Outcome Evaluation:      Patient has been complaining of pain in her hip. Patient is reluctant to take dilaudid as needed for pain. A lidocaine patch was placed on the patient's right hip. Repositioning the patient seems to ease the pain. Patient has been calm and cooperative with care.

## 2024-11-11 NOTE — THERAPY TREATMENT NOTE
"Subjective: Pt agreeable to therapeutic plan of care.    Objective:     Precautions - decreased balance, fear of falling.     Bed mobility - Mod-A and Max-A supine to sit with drawsheet  Transfers - Attempted without Samira Steady but pt with increased fear of falling and was pushing posteriorly despite maxA x2.  Improved technique and balance confidence with anterior support on Samira Steady bar. Mod-A and Assist x 2 with Samira Steady.  Pt was unable to fully extend hips in standing.  Ambulation - unable today. Poor standing balance.     Therapeutic Exercise - 10 Reps B LE AROM supported sitting / chair    Vitals: WNL    Pain: BLE tender FACES 6/10    Education: Provided education on the importance of mobility in the acute care setting, Verbal/Tactile Cues, Transfer Training, and HEP    Assessment: Juany Dalton presents with impaired balance, endurance, posture, strength, and functional mobility which indicate the need for skilled intervention. Pt tolerates Samira Steady well. Hands on training completed with nursing aide for Samira Steady use to ensure pt and staff comfort and safety when pt needs returned to bed. Tolerating session today without incident. Will continue to follow and progress as tolerated.     Plan/Recommendations:   If medically appropriate, Moderate Intensity Therapy recommended post-acute care. This is recommended as therapy feels the patient would require 3-4 days per week and wouldn't tolerate \"3 hour daily\" rehab intensity. SNF would be the preferred choice. If the patient does not agree to SNF, arrange HH or OP depending on home bound status. If patient is medically complex, consider LTACH. Pt requires no DME at discharge.     Pt desires Skilled Rehab placement at discharge. Pt cooperative; agreeable to therapeutic recommendations and plan of care.     Post-Tx Position: Up in Chair, Alarms activated, and Call light and personal items within reach  PPE: gloves    Therapy Charges for Today       Code " Description Service Date Service Provider Modifiers Qty    [id number]  CAREGIVER TRAINING STRATEGIES &TQ EA ADDL 15 MIN 11/11/2024 Della De La O, PT  1    87536027155 HC PT THER PROC EA 15 MIN 11/11/2024 Della De La O, PT GP 1    95132851622 HC PT THERAPEUTIC ACT EA 15 MIN 11/11/2024 Della De La O, PT GP 1           PT Charges       Row Name 11/11/24 1713             Time Calculation    Start Time 1356  -      Stop Time 1420  -      Time Calculation (min) 24 min  -      PT Non-Billable Time (min) 0 min  -      PT Received On 11/11/24  -      PT - Next Appointment 11/12/24  -         Time Calculation- PT    Total Timed Code Minutes- PT 24 minute(s)  -                User Key  (r) = Recorded By, (t) = Taken By, (c) = Cosigned By      Initials Name Provider Type     Della De La O, PT Physical Therapist

## 2024-11-11 NOTE — PROGRESS NOTES
[facility] MEDICINE SERVICE  DAILY PROGRESS NOTE    NAME: Juany Dalton  : 1934  MRN: [id number]      LOS: 3 days     PROVIDER OF SERVICE: KARI Godfrey    Chief Complaint: Shortness of breath    Subjective:     Interval History:  History taken from: patient chart    Patient seen and examined at bedside.  Family at bedside as well.  Family concern patient with BLE muscle cramps last night, and this morning.  Initiating Lasix.  She was treated with magnesium, as well as Dilaudid.  Patient has not complained of pain since, and is resting comfortably.  However and just returned from procedure.    Review of Systems:   Please see above, review of systems otherwise negative     Objective:     Vital Signs  Temp:  [97.5 °F (36.4 °C)-98.2 °F (36.8 °C)] 97.5 °F (36.4 °C)  Heart Rate:  [] 82  Resp:  [14-21] 14  BP: (102-144)/(43-73) 133/60   Body mass index is 25 kg/m².    Physical Exam  PHYSICAL EXAM  Constitutional:  Well-developed, well-nourished, no acute distress, nontoxic appearance   Eyes:  PERRL, conjunctivae normal, EOMI   HENT:  Atraumatic, external ears normal, nose normal, oropharynx moist, no pharyngeal exudates. Neck-normal range of motion, no tenderness, supple, trachea midline  Respiratory:  ctab, nonlabored respirations without accessory muscle use  Cardiovascular:  Normal rate, normal rhythm, no murmurs, no gallops, no rubs   GI:  Soft, nondistended, normal bowel sounds, nontender, no organomegaly, no mass, no rebound, no guarding   :  No costovertebral angle tenderness   Musculoskeletal:  No edema, no tenderness, no deformities  Integument:  Well hydrated, no rash   Lymphatic:  No lymphadenopathy noted   Neurologic:  Alert & oriented x 3, CN 2-12 normal, normal motor function, normal sensory function, no focal deficits noted   Psychiatric:  Speech and behavior appropriate         Diagnostic Data    Results from last 7 days   Lab Units 24  0556   WBC 10*3/mm3 5.98    HEMOGLOBIN g/dL 11.1*   HEMATOCRIT % 35.3   PLATELETS 10*3/mm3 232   GLUCOSE mg/dL 98   CREATININE mg/dL 1.24*   BUN mg/dL 20   SODIUM mmol/L 134*   POTASSIUM mmol/L 4.4   AST (SGOT) U/L 25   ALT (SGPT) U/L 11   ALK PHOS U/L 45   BILIRUBIN mg/dL 0.3   ANION GAP mmol/L 11.4       US Guided Soft Tissue/Muscle Biopsy    Result Date: 11/11/2024  1. Successful ultrasound-guided biopsy of chest wall mass Electronically Signed: Jm Murray MD  11/11/2024 9:04 AM EST  Workstation ID: [id number]       I reviewed the patient's new clinical results.  I reviewed the patient's new imaging results and agree with the interpretation.    Assessment/Plan:     Active and Resolved Problems  Active Hospital Problems    Diagnosis  POA    **Shortness of breath [R06.02]  Yes    Leg swelling [M79.89]  Yes    Chronic midline low back pain without sciatica [M54.50, G89.29]  Yes      Resolved Hospital Problems   No resolved problems to display.       Dyspnea  BLE edema  -Right chest mass on incidental finding  -Incidental finding of hypodense lesion in the pancreatic head  -BNP within normal limits  -IV Lasix x 1 in ED  -Lidocaine patch as needed back pain  -Dilaudid as needed pain  -Biopsy pancreas performed today, awaiting results  -TTE noted mildly elevated tricuspid regurg, thickened aortic valve with adequate opening motion.  EF 60%  -Pulmonology following    ANIBAL on CKD  -Creatinine 1.24  -Strict I's and O's  -Monitor labs  -Electrolyte protocol    Chronic back pain  -Continue home dose of tramadol  -IV Robaxin x 1 in ED.  Transition to Flexeril  -Dilaudid, lidocaine patch as needed    Osteoporosis Hx  -On denosumab Q6M    Spoke in detail with family regarding patient care, biopsy results, and transitioning to skilled nursing facility.  Plan is for patient to go to [facility]    VTE Prophylaxis:  Pharmacologic VTE prophylaxis orders are present.             Disposition Planning:     Barriers to Discharge: Consults, biopsy  results  Anticipated Date of Discharge: 11/12/2024  Place of Discharge: [facility]      Time: 31 minutes     Code Status and Medical Interventions: CPR (Attempt to Resuscitate); Full Support   Ordered at: 11/07/24 1957     Code Status (Patient has no pulse and is not breathing):    CPR (Attempt to Resuscitate)     Medical Interventions (Patient has pulse or is breathing):    Full Support       Signature: Electronically signed by KARI Godfrey, 11/11/24, 13:34 EST.  [facility]

## 2024-11-11 NOTE — CASE MANAGEMENT/SOCIAL WORK
Continued Stay Note  [facility]     Patient Name: Juany Dalton  MRN: [id number]  Today's Date: 11/11/2024    Admit Date: 11/7/2024    Plan: From home with daughter, Radha and son in law. Referred to [facility]; accepted with ready bed on 11/11. PASRR completed.  No precert needed. Pharmacy [facility]   Discharge Plan       Row Name 11/11/24 0904       Plan    Plan From home with daughter, Radha and son in law. Referred to [facility]; accepted with ready bed on 11/11. PASRR completed.  No precert needed. Pharmacy [facility]    Plan Comments Barriers:  Today US Guided Soft Tissue/Muscle Biopsy. CM informed Jeannie with [facility] who reported at discharge [facility] has a ready bed. CM will continue to follow                            Elise BRADFORD,RN Case Manager  [facility]  Phone: Desk- [phone]  Cell- [phone]

## 2024-11-11 NOTE — PLAN OF CARE
Bed mobility - Mod-A and Max-A supine to sit with drawsheet  Transfers - Attempted without Samira Steady but pt with increased fear of falling and was pushing posteriorly despite maxA x2.  Improved technique and balance confidence with anterior support on Samira Steady bar. Mod-A and Assist x 2 with Samira Steady.  Pt was unable to fully extend hips in standing.  Ambulation - unable today. Poor standing balance.     Anticipated Discharge Disposition (PT): skilled nursing facility

## 2024-11-12 ENCOUNTER — READMISSION MANAGEMENT (OUTPATIENT)
Dept: CALL CENTER | Facility: HOSPITAL | Age: 89
End: 2024-11-12
Payer: MEDICARE

## 2024-11-12 VITALS
WEIGHT: 128 LBS | TEMPERATURE: 97.5 F | SYSTOLIC BLOOD PRESSURE: 133 MMHG | RESPIRATION RATE: 15 BRPM | DIASTOLIC BLOOD PRESSURE: 62 MMHG | OXYGEN SATURATION: 96 % | HEIGHT: 60 IN | HEART RATE: 90 BPM | BODY MASS INDEX: 25.13 KG/M2

## 2024-11-12 RX ORDER — FUROSEMIDE 20 MG/1
20 TABLET ORAL DAILY
Qty: 30 TABLET | Refills: 0 | Status: SHIPPED | OUTPATIENT
Start: 2024-11-12 | End: 2024-11-12 | Stop reason: HOSPADM

## 2024-11-12 RX ORDER — MIDODRINE HYDROCHLORIDE 5 MG/1
5 TABLET ORAL
Qty: 90 TABLET | Refills: 0 | Status: SHIPPED | OUTPATIENT
Start: 2024-11-12 | End: 2024-12-12

## 2024-11-12 RX ORDER — METHOCARBAMOL 750 MG/1
750 TABLET, FILM COATED ORAL EVERY 8 HOURS SCHEDULED
Qty: 21 TABLET | Refills: 0 | Status: SHIPPED | OUTPATIENT
Start: 2024-11-12 | End: 2024-11-19

## 2024-11-12 RX ORDER — PRAMIPEXOLE DIHYDROCHLORIDE 1 MG/1
1 TABLET ORAL NIGHTLY
Qty: 30 TABLET | Refills: 0 | Status: SHIPPED | OUTPATIENT
Start: 2024-11-12 | End: 2024-12-12

## 2024-11-12 RX ORDER — HYDROCODONE BITARTRATE AND ACETAMINOPHEN 10; 325 MG/1; MG/1
1 TABLET ORAL EVERY 6 HOURS PRN
Qty: 12 TABLET | Refills: 0 | Status: SHIPPED | OUTPATIENT
Start: 2024-11-12 | End: 2024-11-15

## 2024-11-12 RX ORDER — LIDOCAINE 4 G/G
1 PATCH TOPICAL NIGHTLY
Qty: 30 PATCH | Refills: 0 | Status: SHIPPED | OUTPATIENT
Start: 2024-11-12 | End: 2024-12-12

## 2024-11-12 RX ORDER — METOPROLOL TARTRATE 25 MG/1
12.5 TABLET, FILM COATED ORAL 2 TIMES DAILY WITH MEALS
Qty: 30 TABLET | Refills: 0 | Status: SHIPPED | OUTPATIENT
Start: 2024-11-12 | End: 2024-12-12

## 2024-11-12 RX ORDER — DICYCLOMINE HYDROCHLORIDE 10 MG/1
20 CAPSULE ORAL DAILY PRN
Qty: 30 CAPSULE | Refills: 0 | Status: SHIPPED | OUTPATIENT
Start: 2024-11-12

## 2024-11-12 RX ADMIN — FUROSEMIDE 20 MG: 20 TABLET ORAL at 08:14

## 2024-11-12 RX ADMIN — Medication 12.5 MG: at 08:14

## 2024-11-12 RX ADMIN — HYDROCODONE BITARTRATE AND ACETAMINOPHEN 1 TABLET: 10; 325 TABLET ORAL at 08:35

## 2024-11-12 RX ADMIN — MIDODRINE HYDROCHLORIDE 5 MG: 5 TABLET ORAL at 11:01

## 2024-11-12 RX ADMIN — METHOCARBAMOL TABLETS 750 MG: 750 TABLET, COATED ORAL at 06:32

## 2024-11-12 RX ADMIN — PENTOXIFYLLINE 400 MG: 400 TABLET, EXTENDED RELEASE ORAL at 08:14

## 2024-11-12 RX ADMIN — SENNOSIDES AND DOCUSATE SODIUM 2 TABLET: 50; 8.6 TABLET ORAL at 08:14

## 2024-11-12 RX ADMIN — METHOCARBAMOL TABLETS 750 MG: 750 TABLET, COATED ORAL at 13:10

## 2024-11-12 NOTE — OUTREACH NOTE
Prep Survey      Flowsheet Row Responses   [employer] facility patient discharged from? [facility]   Is LACE score < 7 ? No   Eligibility Not Eligible   What are the reasons patient is not eligible? [facility]   Does the patient have one of the following disease processes/diagnoses(primary or secondary)? Other   Prep survey completed? Yes            Elizabeth JOHNSON - Registered Nurse

## 2024-11-12 NOTE — PLAN OF CARE
Problem: Adult Inpatient Plan of Care  Goal: Absence of Hospital-Acquired Illness or Injury  Intervention: Identify and Manage Fall Risk  Recent Flowsheet Documentation  Taken 11/12/2024 0400 by Alex Beatty RN  Safety Promotion/Fall Prevention:   safety round/check completed   room organization consistent   nonskid shoes/slippers when out of bed   fall prevention program maintained   clutter free environment maintained   assistive device/personal items within reach  Taken 11/12/2024 0200 by Alex Beatty RN  Safety Promotion/Fall Prevention:   safety round/check completed   room organization consistent   nonskid shoes/slippers when out of bed   fall prevention program maintained   clutter free environment maintained   assistive device/personal items within reach  Taken 11/12/2024 0000 by Alex Beatty RN  Safety Promotion/Fall Prevention:   safety round/check completed   room organization consistent   nonskid shoes/slippers when out of bed   fall prevention program maintained   clutter free environment maintained   assistive device/personal items within reach  Taken 11/11/2024 2200 by Alex Beatty RN  Safety Promotion/Fall Prevention:   safety round/check completed   room organization consistent   nonskid shoes/slippers when out of bed   fall prevention program maintained   clutter free environment maintained   assistive device/personal items within reach  Taken 11/11/2024 2000 by Alex Beatty RN  Safety Promotion/Fall Prevention:   safety round/check completed   room organization consistent   nonskid shoes/slippers when out of bed   fall prevention program maintained   clutter free environment maintained   assistive device/personal items within reach  Intervention: Prevent Skin Injury  Recent Flowsheet Documentation  Taken 11/12/2024 0000 by Alex Beatty RN  Body Position: supine  Skin Protection: incontinence pads utilized  Taken 11/11/2024 2000 by Alex Beatty, CHARLES  Body  Position: supine  Skin Protection: incontinence pads utilized  Intervention: Prevent Infection  Recent Flowsheet Documentation  Taken 11/12/2024 0400 by Alex Beatty RN  Infection Prevention:   environmental surveillance performed   hand hygiene promoted   personal protective equipment utilized   rest/sleep promoted   single patient room provided  Taken 11/12/2024 0000 by Alex Beatty RN  Infection Prevention:   environmental surveillance performed   hand hygiene promoted   personal protective equipment utilized   rest/sleep promoted   single patient room provided  Taken 11/11/2024 2000 by Alex Beatty RN  Infection Prevention:   environmental surveillance performed   hand hygiene promoted   personal protective equipment utilized   rest/sleep promoted   single patient room provided  Goal: Optimal Comfort and Wellbeing  Intervention: Provide Person-Centered Care  Recent Flowsheet Documentation  Taken 11/11/2024 2000 by Alex Beatty RN  Trust Relationship/Rapport:   care explained   questions answered     Problem: Fall Injury Risk  Goal: Absence of Fall and Fall-Related Injury  Intervention: Promote Injury-Free Environment  Recent Flowsheet Documentation  Taken 11/12/2024 0400 by Alex Beatty RN  Safety Promotion/Fall Prevention:   safety round/check completed   room organization consistent   nonskid shoes/slippers when out of bed   fall prevention program maintained   clutter free environment maintained   assistive device/personal items within reach  Taken 11/12/2024 0200 by Alex Beatty RN  Safety Promotion/Fall Prevention:   safety round/check completed   room organization consistent   nonskid shoes/slippers when out of bed   fall prevention program maintained   clutter free environment maintained   assistive device/personal items within reach  Taken 11/12/2024 0000 by Alex Beatty RN  Safety Promotion/Fall Prevention:   safety round/check completed   room organization  consistent   nonskid shoes/slippers when out of bed   fall prevention program maintained   clutter free environment maintained   assistive device/personal items within reach  Taken 11/11/2024 2200 by Alex Beatty RN  Safety Promotion/Fall Prevention:   safety round/check completed   room organization consistent   nonskid shoes/slippers when out of bed   fall prevention program maintained   clutter free environment maintained   assistive device/personal items within reach  Taken 11/11/2024 2000 by Alex Beatty RN  Safety Promotion/Fall Prevention:   safety round/check completed   room organization consistent   nonskid shoes/slippers when out of bed   fall prevention program maintained   clutter free environment maintained   assistive device/personal items within reach     Problem: Skin Injury Risk Increased  Goal: Skin Health and Integrity  Intervention: Optimize Skin Protection  Recent Flowsheet Documentation  Taken 11/12/2024 0400 by Alex Beatty RN  Pressure Reduction Techniques: heels elevated off bed  Pressure Reduction Devices: pressure-redistributing mattress utilized  Taken 11/12/2024 0000 by Alex Beatty RN  Pressure Reduction Techniques: heels elevated off bed  Head of Bed (HOB) Positioning: [facility] elevated  Pressure Reduction Devices: pressure-redistributing mattress utilized  Skin Protection: incontinence pads utilized  Taken 11/11/2024 2000 by Alex Beatty RN  Pressure Reduction Techniques: heels elevated off bed  Head of Bed (HOB) Positioning: HOB elevated  Pressure Reduction Devices: pressure-redistributing mattress utilized  Skin Protection: incontinence pads utilized   Goal Outcome Evaluation:      Patient is anticipating to be discharged today. Patient has been pleasant, calm, and cooperative with care.

## 2024-11-12 NOTE — DISCHARGE SUMMARY
"[facility] Medicine Services  Discharge Summary    Date of Service: 2024  Patient Name: Juany Dalton  : 1934  MRN: [id number]    Date of Admission: 2024  Discharge Diagnosis: Shortness of breath  Date of Discharge: 2024  Primary Care Physician: Orion Salmeron MD      Presenting Problem:   Shortness of breath [R06.02]  Elevated troponin [R79.89]  Bilateral leg edema [R60.0]  Chest wall mass [R22.2]  Lung cancer metastatic to bone [C34.90, C79.51]    Active and Resolved Hospital Problems:  Active Hospital Problems    Diagnosis POA    **Shortness of breath [R06.02] Yes    Leg swelling [M79.89] Yes    Chronic midline low back pain without sciatica [M54.50, G89.29] Yes      Resolved Hospital Problems   No resolved problems to display.         Hospital Course     HPI:    \" Juany Dalton is a 89 y.o. female with a CMH of chronic back pain, osteoporosis, arthritis, history of left breast lumpectomy (in the 70s) that was benign, lower extremity lymphedema, growing chest mass x 3 months who presented to [facility] on 2024 with shortness of breath and bilateral lower extremity edema progressively gotten worse in the past week.  His night sweats, weight loss, cough, chest pain, nausea vomiting or diarrhea.     Of note, patient noticed a mid chest mass in August that her PCP suspected to be a bony protrusion.  She saw her PCP in the past week for a UTI and her PCP ordered a chest CT due to increased growth of the mass.        Pertinent ED findings: 181/82, P1, 20, 98.1 °F, 96% on room air.  Creatinine 1.28, hemoglobin 11.  CT chest: Invasive right chest wall mass measuring 3.1 x 4.2 x 10 cm involving the intercostal spaces and ribs concerning for malignancy breast. Hypodense  lesion of the pancreatic head.  No consolidation, effusion noted.  EKG showed sinus rhythm with first-degree AV block.  QTc 436 MS. received Lasix IV and analgesics.   \"    Hospital " Course:  Patient started on Lasix, which resulted in adequate urine output, as well as improved shortness of breath and decreased bilateral lower extremity edema.  Patient developed bilateral muscle cramps post initiation of Lasix, was started on Robaxin as needed as well as given magnesium IV to treat hypomagnesemia of 1.8.  Which improved to 2.2 post electrolyte treatment.  GI was consulted, MRI of the pancreas noted a ill-defined mass at the pancreatic head measuring 1.4 cm representing primary malignancy.  CA .4.  These findings as well as ENT of chest which noted invasive right chest mass measuring 3.1 x 4.2 x 10 cm suggestive of malignancy called for hematology consultation.  Patient underwent biopsy, results pending.  PT evaluated this patient recommended patient to go to skilled nursing facility.  Goals of care were discussed with family, and agree with plan.  Plan is for patient to be transferred to [facility] via wheelchair transportation.      DISCHARGE Follow Up Recommendations for labs and diagnostics: Hematology as discussed.        Day of Discharge     Vital Signs:  Temp:  [97.3 °F (36.3 °C)-98.2 °F (36.8 °C)] 97.6 °F (36.4 °C)  Heart Rate:  [68-94] 94  Resp:  [11-18] 14  BP: (116-143)/(51-65) 143/60    Physical Exam:  PHYSICAL EXAM  Constitutional:  Well-developed, well-nourished, no acute distress, nontoxic appearance   Eyes:  PERRL, conjunctivae normal, EOMI   HENT:  Atraumatic, external ears normal, nose normal, oropharynx moist, no pharyngeal exudates. Neck-normal range of motion, no tenderness, supple, trachea midline  Respiratory:  ctab, nonlabored respirations without accessory muscle use  Cardiovascular:  Normal rate, normal rhythm, no murmurs, no gallops, no rubs   GI:  Soft, nondistended, normal bowel sounds, nontender, no organomegaly, no mass, no rebound, no guarding   :  No costovertebral angle tenderness   Musculoskeletal:  No edema, no tenderness, no deformities  Integument:   Well hydrated, no rash   Lymphatic:  No lymphadenopathy noted   Neurologic:  Alert & oriented x 3, CN 2-12 normal, normal motor function, normal sensory function, no focal deficits noted   Psychiatric:  Speech and behavior appropriate        Pertinent  and/or Most Recent Results     LAB RESULTS:      Lab 11/11/24  0746 11/11/24  0556 11/10/24  0419 11/09/24  0537 11/08/24  1249 11/08/24  0420 11/07/24  1451   WBC  --  5.98 5.08 5.01  --  6.04 5.88   HEMOGLOBIN  --  11.1* 10.0* 10.7*  --  9.9* 11.0*   HEMATOCRIT  --  35.3 31.2* 33.0*  --  30.7* 34.5   PLATELETS  --  232 224 203  --  191 229   NEUTROS ABS  --  3.81 3.35 3.30  --  4.65 4.58   IMMATURE GRANS (ABS)  --  0.02 0.02 0.02  --   --  0.02   LYMPHS ABS  --  1.16 0.91 0.88  --   --  0.74   MONOS ABS  --  0.66 0.51 0.56  --   --  0.41   EOS ABS  --  0.28 0.26 0.21  --   --  0.11   MCV  --  87.6 86.2 86.6  --  85.5 86.9   PROTIME 14.7*  --   --   --   --   --   --    APTT 27.5  --   --   --   --   --   --    D DIMER QUANT  --   --   --   --  1.92*  --   --          Lab 11/11/24  0556 11/10/24  0419 11/09/24  0537 11/08/24  0420 11/07/24  1743 11/07/24  1451   SODIUM 134* 134* 135* 133*  --  133*   POTASSIUM 4.4 4.1 3.9 4.2  --  4.7   CHLORIDE 97* 98 100 99  --  97*   CO2 25.6 24.7 23.2 22.4  --  21.8*   ANION GAP 11.4 11.3 11.8 11.6  --  14.2   BUN 20 16 19 20  --  19   CREATININE 1.24* 1.13* 1.29* 1.46*  --  1.28*   EGFR 41.7* 46.6* 39.8* 34.3*  --  40.1*   GLUCOSE 98 90 107* 90  --  96   CALCIUM 9.8 9.0 9.4 9.0  --  9.7   MAGNESIUM 2.2 1.8 1.9 2.2 1.9  --    PHOSPHORUS 5.1* 4.2 4.2 4.6*  --   --          Lab 11/11/24  0556 11/10/24  0419 11/09/24  0537 11/08/24  0420 11/07/24  1451   TOTAL PROTEIN 6.7 6.2 6.6 6.2 6.9   ALBUMIN 3.9 3.8 3.9 3.8 4.5   GLOBULIN 2.8 2.4 2.7 2.4 2.4   ALT (SGPT) 11 15 17 15 18   AST (SGOT) 25 28 31 30 28   BILIRUBIN 0.3 0.3 0.3 0.4 0.3   ALK PHOS 45 41 43 43 50         Lab 11/11/24  0746 11/07/24  1743 11/07/24  1539 11/07/24  1451    PROBNP  --   --   --  946.0   HSTROP T  --  54* 54*  --    PROTIME 14.7*  --   --   --    INR 1.15*  --   --   --                  Brief Urine Lab Results  (Last result in the past 365 days)        Color   Clarity   Blood   Leuk Est   Nitrite   Protein   CREAT   Urine HCG        11/07/24 1452 Yellow   Clear   Trace   Trace   Negative   Negative                 Microbiology Results (last 10 days)       ** No results found for the last 240 hours. **            US Guided Soft Tissue/Muscle Biopsy    Result Date: 11/11/2024  Impression: 1. Successful ultrasound-guided biopsy of chest wall mass Electronically Signed: Jm Murray MD  11/11/2024 9:04 AM EST  Workstation ID: [id number]    MRI Abdomen Without Contrast    Result Date: 11/8/2024  Impression: Impression: 1. Multiple exam limitations as above. 2. Ill-defined mass in the pancreatic head measuring 1.4 cm potentially representing a primary malignancy (pancreatic adenocarcinoma). Metastatic disease from other primary is possible. Consider correlation with EUS/FNA. 3. Infiltrative right anterior chest wall and left posterolateral chest wall masses highly suspicious for malignancy, probably metastatic. 1.9 cm nodule in the right retroperitoneum appearing similar by T2 weighted and diffusion weighted sequences suspicious for metastatic disease. Differential includes lymphoma. 4. Cholelithiasis. No findings to suggest acute cholecystitis or biliary obstruction. 5. Body wall edema. Electronically Signed: Carlos Flores MD  11/8/2024 10:08 PM EST  Workstation ID: [id number]    CT Chest With Contrast Diagnostic    Addendum Date: 11/8/2024    ADDENDUM #1 Addendum begins. Request to specifically assess for pulmonary embolism. No filling defect in the pulmonary arteries to suggest embolism. Addendum ends. Electronically Signed: Carlos Flores MD  11/8/2024 2:46 PM EST  Workstation ID: [id number] ORIGINAL REPORT: CT CHEST W CONTRAST DIAGNOSTIC Date of Exam: 11/7/2024 4:56 PM  EST Indication: sternal mass. Comparison: CT chest 2/5/2009 Technique: Axial CT images were obtained of the chest after the uneventful intravenous administration of iodinated contrast.  Sagittal and coronal reconstructions were performed.  Automated exposure control and iterative reconstruction methods were used. Findings: Thyroid grossly unremarkable. No suspicious supraclavicular adenopathy. Aortic atherosclerotic disease without aneurysm. Severe coronary atherosclerotic disease. Mitral annular calcifications. Heart size within normal limits. No pericardial effusion. Normal caliber main pulmonary artery. No suspicious mediastinal or hilar adenopathy. No convincing internal mammary chain adenopathy. There is fluid in the midesophagus. Trachea patent. Negative for infectious consolidation, edema, effusion or pneumothorax. Fairly symmetric biapical pleural-parenchymal scarring. Few small nodular densities in the right apex without significant change from 2009 favored chronic/benign. No overtly suspicious pulmonary nodule. Benign densely calcified left lower lobe pulmonary granuloma. There is a soft tissue mass in the midline/right paramidline lower chest wall measuring up to 3.1 x 4.2 x 10 cm AP, transverse and craniocaudal dimension example image 80 series 2. Mass extends through the chest wall into the right cardiophrenic space image 89 series 2 probably involving portions of the anterior fifth, sixth and seventh costal cartilage and intercostal spaces. No overtly suspicious axillary adenopathy or other discrete chest wall mass. Degenerative elated changes throughout the spine. Previous kyphoplasty at T11 and T12 with chronic compression fractures. There is up to 6 mm bony retropulsion at T12. Minimal retrolisthesis L1 on L2, L2 on L3 and anterolisthesis L3 on L4 likely degenerative. Gallstones without findings of acute cholecystitis. No suspicious liver lesion. Punctate nonobstructing right upper pole renal  calculus. No dilation of biliary tree. Ill-defined hypodensity measuring 1.4 cm in the medial aspect of pancreatic head image 102 series 2. Upstream pancreatic duct is slightly prominent measuring 3 mm. Impression: 1. Locally invasive right chest wall mass involving costal cartilage and intercostal spaces centered at anterior fifth through seventh ribs. Findings concerning for malignancy, possibly of breast malignancy although differential may include sarcoma, lymphoma or metastatic disease from other etiology. Correlate with tissue sampling. 2. No convincing adenopathy or suspicious pulmonary nodule in the chest. 3. Ill-defined hypodense lesion in the pancreatic head. Recommend abdominal MRI without and with IV contrast to exclude mass. 4. No acute airspace process. Chronic biapical pleural-parenchymal scarring. 5. Aortic and coronary atherosclerotic disease. 6. Cholelithiasis without findings of acute cholecystitis. 7. Other chronic/ancillary findings as above. Electronically Signed: Carlos Flores MD  11/7/2024 5:38 PM EST  Workstation ID: [id number]    Result Date: 11/8/2024  Impression: Impression: 1. Locally invasive right chest wall mass involving costal cartilage and intercostal spaces centered at anterior fifth through seventh ribs. Findings concerning for malignancy, possibly of breast malignancy although differential may include sarcoma, lymphoma or metastatic disease from other etiology. Correlate with tissue sampling. 2. No convincing adenopathy or suspicious pulmonary nodule in the chest. 3. Ill-defined hypodense lesion in the pancreatic head. Recommend abdominal MRI without and with IV contrast to exclude mass. 4. No acute airspace process. Chronic biapical pleural-parenchymal scarring. 5. Aortic and coronary atherosclerotic disease. 6. Cholelithiasis without findings of acute cholecystitis. 7. Other chronic/ancillary findings as above. Electronically Signed: Carlos Flores MD  11/7/2024 5:38 PM EST   Workstation ID: [id number]    XR Chest 1 View    Result Date: 11/7/2024  Impression: Impression: No active disease. Electronically Signed: Cedric Henry MD  11/7/2024 3:40 PM EST  Workstation ID: [id number]     Results for orders placed during the hospital encounter of 11/07/24    Duplex Venous Lower Extremity - Bilateral CAR    Interpretation Summary    Normal bilateral lower extremity venous duplex scan.      Results for orders placed during the hospital encounter of 11/07/24    Duplex Venous Lower Extremity - Bilateral CAR    Interpretation Summary    Normal bilateral lower extremity venous duplex scan.      Results for orders placed during the hospital encounter of 11/07/24    Adult Transthoracic Echo Complete w/ Color, Spectral and Contrast if necessary per protocol    Interpretation Summary    Estimated right ventricular systolic pressure from tricuspid regurgitation is mildly elevated (35-45 mmHg).    Indications  Valvular function.    Technically satisfactory study.  Mitral valve is structurally normal.  Mitral annular calcification is present.  Mild mitral regurgitation is present.  Tricuspid valve is structurally normal.  Mild tricuspid regurgitation is present.  Aortic valve is thickened with adequate opening motion.  Pulmonic valve could not be well visualized.  Minimal pulmonic regurgitation is present.  Mild mitral and aortic regurgitation is present.  Mild pulmonary hypertension is present.  Left atrium is normal in size.  Right atrium is normal in size.  Left ventricle is normal in size and contractility with ejection fraction of 60%.  Grade 1 left ventricular diastolic dysfunction is present.  (MV/EA ratio 0.7).  Left ventricular peak systolic global longitudinal strain (GLS) is abnormal at -16%.  Right ventricle is normal in size with TAPSE of 2 cm..  Atrial septum is intact.  Aorta is normal.  IVC is dilated with decreased respiratory variation.  Right atrial pressure is 8 mmHg.  No pericardial effusion  or intracardiac thrombus is seen.    Impression  Mitral annular calcification.  Mild mitral tricuspid aortic and pulmonic regurgitation.  Aortic valve is thickened with adequate opening motion.  Structurally and functionally normal cardiac valves.  Left ventricular size and contractility is normal with ejection fraction of 60%.  Grade 1 left ventricular diastolic dysfunction is present.  (MV/EA ratio 0.7).  Left ventricular peak systolic global longitudinal strain (GLS) is abnormal at -16%.  Right ventricle is normal in size with TAPSE of 2 cm.  IVC is dilated with decreased respiratory variation.  Right atrial pressure is 8 mmHg.      Labs Pending at Discharge:  Pending Results       None            Procedures Performed           Consults:   Consults       Date and Time Order Name Status Description    11/8/2024 12:46 PM Inpatient Gastroenterology Consult Completed     11/7/2024  8:20 PM Inpatient Hematology & Oncology Consult                Discharge Details        Discharge Medications        New Medications        Instructions Start Date   dicyclomine 10 MG capsule  Commonly known as: BENTYL  Replaces: dicyclomine 20 MG tablet   20 mg, Oral, Daily PRN      furosemide 20 MG tablet  Commonly known as: LASIX   20 mg, Oral, Daily      HYDROcodone-acetaminophen  MG per tablet  Commonly known as: NORCO   1 tablet, Oral, Every 6 Hours PRN      Lidocaine 4 %   1 patch, Transdermal, Nightly, Remove & Discard patch within 12 hours or as directed by MD      methocarbamol 750 MG tablet  Commonly known as: ROBAXIN   750 mg, Oral, Every 8 Hours Scheduled      midodrine 5 MG tablet  Commonly known as: PROAMATINE   5 mg, Oral, 3 Times Daily Before Meals             Changes to Medications        Instructions Start Date   metoprolol tartrate 25 MG tablet  Commonly known as: LOPRESSOR  What changed: how much to take   12.5 mg, Oral, 2 Times Daily With Meals      pramipexole 1 MG tablet  Commonly known as: MIRAPEX  What  changed:   medication strength  how much to take  when to take this   1 mg, Oral, Nightly             Continue These Medications        Instructions Start Date   latanoprost 0.005 % ophthalmic solution  Commonly known as: XALATAN   1 drop, Nightly      mirtazapine 15 MG tablet  Commonly known as: REMERON   15 mg, Nightly      pentoxifylline 400 MG CR tablet  Commonly known as: TRENtal   400 mg, 2 Times Daily      PROLIA SC   Subcutaneous, Every 6 Hours      traMADol 50 MG tablet  Commonly known as: ULTRAM   50 mg, Oral, Every 6 Hours PRN             Stop These Medications      dicyclomine 20 MG tablet  Commonly known as: BENTYL  Replaced by: dicyclomine 10 MG capsule     sulfamethoxazole-trimethoprim 800-160 MG per tablet  Commonly known as: BACTRIM DS,SEPTRA DS              Allergies   Allergen Reactions    Morphine Hives and Rash    Ampicillin Hives         Discharge Disposition: To [facility]  Home or Self Care    Diet:  Hospital:  Diet Order   Procedures    Diet: Cardiac; Healthy Heart (2-3 Na+); Fluid Consistency: Thin (IDDSI 0)         Discharge Activity:         CODE STATUS:  Code Status and Medical Interventions: CPR (Attempt to Resuscitate); Full Support   Ordered at: 11/07/24 1957     Code Status (Patient has no pulse and is not breathing):    CPR (Attempt to Resuscitate)     Medical Interventions (Patient has pulse or is breathing):    Full Support         No future appointments.        Time spent on Discharge including face to face service:  31 minutes    Signature: Electronically signed by KARI Godfrey, 11/12/24, 08:30 EST.  [facility]

## 2024-11-12 NOTE — CASE MANAGEMENT/SOCIAL WORK
Case Management Discharge Note      Final Note: [facility]    Provided Post Acute Provider List?: Yes  Post Acute Provider List: Nursing Home  Delivered To: Support Person  Support Person: Radha webber  Method of Delivery: In person    Selected Continued Care - Discharged on 11/12/2024 Admission date: 11/7/2024 - Discharge disposition: Home or Self Care      Destination Coordination complete.      Service Provider Services Address Phone Fax Patient Preferred    [facility] [address] [place] [address] [phone] --                    Transportation Services  Private: Car    Final Discharge Disposition Code: 03 - skilled nursing facility (SNF)

## 2024-11-12 NOTE — CASE MANAGEMENT/SOCIAL WORK
Continued Stay Note  [facility]     Patient Name: Juany Dalton  MRN: [id number]  Today's Date: 11/12/2024    Admit Date: 11/7/2024    Plan: From home with daughter, Radha and son in law. Referred to [facility]; accepted with ready bed on 11/11. PASRR completed.  No precert needed. Pharmacy [facility]   Discharge Plan       Row Name 11/12/24 0919       Plan    Plan Comments DC today to [facility]. CM informed Jeannie with [facility] of DC orders. CM placed [facility] Epic request for wheelchair van as a WILL CALL. CM informed nursing                      Elise BRADFORD,RN Case Manager  [facility]  Phone: Desk- [phone] cell- [phone]

## 2024-11-12 NOTE — PLAN OF CARE
Goal Outcome Evaluation:  Plan of Care Reviewed With: patient, child        Progress: no change    Discharging to [facility]

## 2024-11-14 LAB — Lab: NORMAL

## 2024-11-15 ENCOUNTER — TELEPHONE (OUTPATIENT)
Dept: ONCOLOGY | Facility: CLINIC | Age: 89
End: 2024-11-15
Payer: MEDICARE

## 2024-11-15 NOTE — TELEPHONE ENCOUNTER
Caller: MAI VILLAGOMEZ    Relationship: Emergency Contact (DAUGHTER)    Best call back number: [phone]    What is the best time to reach you: ANYTIME    Who are you requesting to speak with (clinical staff, provider,  specific staff member):   DR MACKEY - CLINICAL    What was the call regarding: PT WAS SEEN IN THE HOSPITAL BY DR MACKEY HE HAD ORDERED A BIOPSY, RESULTS HAVE BEEN UPLOADED TO HER [facility] AND MAI (DAUGHTER) IS REQUESTING A CALL BACK TO ADVISE.

## 2024-11-20 LAB
LAB AP CASE REPORT: NORMAL
LAB AP DIAGNOSIS COMMENT: NORMAL
LAB AP FLOW CYTOMETRY SUMMARY: NORMAL
Lab: NORMAL
PATH REPORT.FINAL DX SPEC: NORMAL
PATH REPORT.GROSS SPEC: NORMAL

## 2024-11-22 LAB — Lab: NORMAL

## 2024-11-22 NOTE — TELEPHONE ENCOUNTER
Attempted to contact the patient to schedule a hospital follow-up appt with Dr. Villagran due to receiving her biopsy report on 11/19/24. Patient is currently admitted to [facility] therefore unable to speak with the patient. Patients daughter Radha stated they obtained a referral for her mother to be seen at [facility] due to not hearing back from our office regarding the pathology results; I informed her that we had not receive the biopsy results yet which is why we had not contacted them however, Dr. Villagran is wanting to see her mother next week. She stated they are going to continue with seeing the MD's with [facility]. I confirmed and advised for her to contact our office if they would like to schedule an appt. She confirmed.

## (undated) DEVICE — BITEBLOCK ENDO W/STRAP 60F A/ LF DISP

## (undated) DEVICE — PK ENDO GI 50